# Patient Record
Sex: MALE | Race: WHITE | Employment: UNEMPLOYED | URBAN - METROPOLITAN AREA
[De-identification: names, ages, dates, MRNs, and addresses within clinical notes are randomized per-mention and may not be internally consistent; named-entity substitution may affect disease eponyms.]

---

## 2017-02-09 ENCOUNTER — HOSPITAL ENCOUNTER (OUTPATIENT)
Dept: SURGERY | Age: 57
Discharge: HOME | End: 2017-02-09
Attending: INTERNAL MEDICINE | Admitting: INTERNAL MEDICINE

## 2017-02-09 VITALS
WEIGHT: 225 LBS | OXYGEN SATURATION: 95 % | RESPIRATION RATE: 16 BRPM | HEART RATE: 79 BPM | DIASTOLIC BLOOD PRESSURE: 66 MMHG | HEIGHT: 71 IN | BODY MASS INDEX: 31.5 KG/M2 | TEMPERATURE: 98 F | SYSTOLIC BLOOD PRESSURE: 118 MMHG

## 2017-02-09 LAB
GLUCOSE BLD-MCNC: 102 MG/DL (ref 70–99)
PERFORMED ON: ABNORMAL

## 2017-02-09 RX ORDER — LIDOCAINE HYDROCHLORIDE 10 MG/ML
0.1 INJECTION, SOLUTION INFILTRATION; PERINEURAL ONCE
Status: DISCONTINUED | OUTPATIENT
Start: 2017-02-09 | End: 2017-02-10 | Stop reason: HOSPADM

## 2017-02-09 RX ORDER — SODIUM CHLORIDE, SODIUM LACTATE, POTASSIUM CHLORIDE, CALCIUM CHLORIDE 600; 310; 30; 20 MG/100ML; MG/100ML; MG/100ML; MG/100ML
INJECTION, SOLUTION INTRAVENOUS CONTINUOUS
Status: DISCONTINUED | OUTPATIENT
Start: 2017-02-09 | End: 2017-02-10 | Stop reason: HOSPADM

## 2017-02-09 RX ORDER — ATORVASTATIN CALCIUM 40 MG/1
40 TABLET, FILM COATED ORAL DAILY
COMMUNITY
End: 2017-08-14

## 2017-02-09 RX ORDER — LOSARTAN POTASSIUM 50 MG/1
50 TABLET ORAL DAILY
COMMUNITY
End: 2020-01-21 | Stop reason: SDUPTHER

## 2017-02-09 RX ADMIN — SODIUM CHLORIDE, SODIUM LACTATE, POTASSIUM CHLORIDE, CALCIUM CHLORIDE: 600; 310; 30; 20 INJECTION, SOLUTION INTRAVENOUS at 08:00

## 2017-02-09 ASSESSMENT — PAIN - FUNCTIONAL ASSESSMENT: PAIN_FUNCTIONAL_ASSESSMENT: 0-10

## 2017-05-25 ENCOUNTER — HOSPITAL ENCOUNTER (OUTPATIENT)
Dept: OTHER | Age: 57
Discharge: HOME | End: 2017-05-25

## 2017-05-25 DIAGNOSIS — M25.512 ACUTE SHOULDER PAIN DUE TO TRAUMA, LEFT: ICD-10-CM

## 2017-05-25 DIAGNOSIS — G89.11 ACUTE SHOULDER PAIN DUE TO TRAUMA, LEFT: ICD-10-CM

## 2017-07-10 ENCOUNTER — OFFICE VISIT (OUTPATIENT)
Dept: ORTHOPEDIC SURGERY | Age: 57
End: 2017-07-10

## 2017-07-10 VITALS — WEIGHT: 225.09 LBS | BODY MASS INDEX: 31.51 KG/M2 | HEIGHT: 71 IN

## 2017-07-10 DIAGNOSIS — M25.512 LEFT SHOULDER PAIN, UNSPECIFIED CHRONICITY: Primary | ICD-10-CM

## 2017-07-10 DIAGNOSIS — M75.122 COMPLETE TEAR OF LEFT ROTATOR CUFF: ICD-10-CM

## 2017-07-10 PROCEDURE — 73030 X-RAY EXAM OF SHOULDER: CPT | Performed by: ORTHOPAEDIC SURGERY

## 2017-07-10 PROCEDURE — 99203 OFFICE O/P NEW LOW 30 MIN: CPT | Performed by: ORTHOPAEDIC SURGERY

## 2017-08-07 ENCOUNTER — OFFICE VISIT (OUTPATIENT)
Dept: ORTHOPEDIC SURGERY | Age: 57
End: 2017-08-07

## 2017-08-07 VITALS — BODY MASS INDEX: 29.12 KG/M2 | WEIGHT: 215 LBS | HEIGHT: 72 IN

## 2017-08-07 DIAGNOSIS — M75.122 COMPLETE TEAR OF LEFT ROTATOR CUFF: Primary | ICD-10-CM

## 2017-08-07 PROCEDURE — 99213 OFFICE O/P EST LOW 20 MIN: CPT | Performed by: ORTHOPAEDIC SURGERY

## 2017-08-08 ENCOUNTER — TELEPHONE (OUTPATIENT)
Dept: ORTHOPEDIC SURGERY | Age: 57
End: 2017-08-08

## 2017-08-08 DIAGNOSIS — M75.122 COMPLETE ROTATOR CUFF TEAR OF LEFT SHOULDER: Primary | ICD-10-CM

## 2017-08-21 DIAGNOSIS — M75.122 COMPLETE ROTATOR CUFF TEAR OF LEFT SHOULDER: Primary | ICD-10-CM

## 2017-08-21 PROCEDURE — L3670 SO ACRO/CLAV CAN WEB PRE OTS: HCPCS | Performed by: ORTHOPAEDIC SURGERY

## 2017-08-22 ENCOUNTER — HOSPITAL ENCOUNTER (OUTPATIENT)
Dept: SURGERY | Age: 57
Discharge: HOME | End: 2017-08-22
Attending: ORTHOPAEDIC SURGERY | Admitting: ORTHOPAEDIC SURGERY

## 2017-08-22 VITALS
BODY MASS INDEX: 29.12 KG/M2 | SYSTOLIC BLOOD PRESSURE: 136 MMHG | HEIGHT: 72 IN | DIASTOLIC BLOOD PRESSURE: 75 MMHG | OXYGEN SATURATION: 100 % | WEIGHT: 215 LBS | HEART RATE: 90 BPM | TEMPERATURE: 97.2 F | RESPIRATION RATE: 18 BRPM

## 2017-08-22 LAB
GLUCOSE BLD-MCNC: 110 MG/DL (ref 70–99)
GLUCOSE BLD-MCNC: 113 MG/DL (ref 70–99)
PERFORMED ON: ABNORMAL
PERFORMED ON: ABNORMAL

## 2017-08-22 RX ORDER — ACETAMINOPHEN 10 MG/ML
1000 INJECTION, SOLUTION INTRAVENOUS ONCE
Status: COMPLETED | OUTPATIENT
Start: 2017-08-22 | End: 2017-08-22

## 2017-08-22 RX ORDER — MORPHINE SULFATE 2 MG/ML
1 INJECTION, SOLUTION INTRAMUSCULAR; INTRAVENOUS EVERY 5 MIN PRN
Status: DISCONTINUED | OUTPATIENT
Start: 2017-08-22 | End: 2017-08-23 | Stop reason: HOSPADM

## 2017-08-22 RX ORDER — LABETALOL HYDROCHLORIDE 5 MG/ML
5 INJECTION, SOLUTION INTRAVENOUS EVERY 10 MIN PRN
Status: DISCONTINUED | OUTPATIENT
Start: 2017-08-22 | End: 2017-08-23 | Stop reason: HOSPADM

## 2017-08-22 RX ORDER — OXYCODONE HYDROCHLORIDE AND ACETAMINOPHEN 5; 325 MG/1; MG/1
1 TABLET ORAL PRN
Status: ACTIVE | OUTPATIENT
Start: 2017-08-22 | End: 2017-08-22

## 2017-08-22 RX ORDER — OXYCODONE HYDROCHLORIDE AND ACETAMINOPHEN 5; 325 MG/1; MG/1
2 TABLET ORAL PRN
Status: ACTIVE | OUTPATIENT
Start: 2017-08-22 | End: 2017-08-22

## 2017-08-22 RX ORDER — SODIUM CHLORIDE 0.9 % (FLUSH) 0.9 %
10 SYRINGE (ML) INJECTION EVERY 12 HOURS SCHEDULED
Status: DISCONTINUED | OUTPATIENT
Start: 2017-08-22 | End: 2017-08-23 | Stop reason: HOSPADM

## 2017-08-22 RX ORDER — OXYCODONE HYDROCHLORIDE AND ACETAMINOPHEN 5; 325 MG/1; MG/1
1-2 TABLET ORAL EVERY 6 HOURS PRN
Qty: 40 TABLET | Refills: 0 | Status: SHIPPED | OUTPATIENT
Start: 2017-08-22 | End: 2019-07-03

## 2017-08-22 RX ORDER — DIPHENHYDRAMINE HYDROCHLORIDE 50 MG/ML
12.5 INJECTION INTRAMUSCULAR; INTRAVENOUS
Status: ACTIVE | OUTPATIENT
Start: 2017-08-22 | End: 2017-08-22

## 2017-08-22 RX ORDER — SODIUM CHLORIDE 0.9 % (FLUSH) 0.9 %
10 SYRINGE (ML) INJECTION PRN
Status: DISCONTINUED | OUTPATIENT
Start: 2017-08-22 | End: 2017-08-23 | Stop reason: HOSPADM

## 2017-08-22 RX ORDER — HYDRALAZINE HYDROCHLORIDE 20 MG/ML
5 INJECTION INTRAMUSCULAR; INTRAVENOUS
Status: DISCONTINUED | OUTPATIENT
Start: 2017-08-22 | End: 2017-08-23 | Stop reason: HOSPADM

## 2017-08-22 RX ORDER — LIDOCAINE HYDROCHLORIDE 10 MG/ML
1 INJECTION, SOLUTION EPIDURAL; INFILTRATION; INTRACAUDAL; PERINEURAL
Status: ACTIVE | OUTPATIENT
Start: 2017-08-22 | End: 2017-08-22

## 2017-08-22 RX ORDER — ONDANSETRON 2 MG/ML
4 INJECTION INTRAMUSCULAR; INTRAVENOUS
Status: ACTIVE | OUTPATIENT
Start: 2017-08-22 | End: 2017-08-22

## 2017-08-22 RX ORDER — SODIUM CHLORIDE, SODIUM LACTATE, POTASSIUM CHLORIDE, CALCIUM CHLORIDE 600; 310; 30; 20 MG/100ML; MG/100ML; MG/100ML; MG/100ML
INJECTION, SOLUTION INTRAVENOUS CONTINUOUS
Status: DISCONTINUED | OUTPATIENT
Start: 2017-08-22 | End: 2017-08-23 | Stop reason: HOSPADM

## 2017-08-22 RX ORDER — MIDAZOLAM HYDROCHLORIDE 1 MG/ML
INJECTION INTRAMUSCULAR; INTRAVENOUS
Status: DISPENSED
Start: 2017-08-22 | End: 2017-08-22

## 2017-08-22 RX ORDER — MORPHINE SULFATE 2 MG/ML
2 INJECTION, SOLUTION INTRAMUSCULAR; INTRAVENOUS EVERY 5 MIN PRN
Status: DISCONTINUED | OUTPATIENT
Start: 2017-08-22 | End: 2017-08-23 | Stop reason: HOSPADM

## 2017-08-22 RX ORDER — MEPERIDINE HYDROCHLORIDE 50 MG/ML
12.5 INJECTION INTRAMUSCULAR; INTRAVENOUS; SUBCUTANEOUS EVERY 5 MIN PRN
Status: DISCONTINUED | OUTPATIENT
Start: 2017-08-22 | End: 2017-08-23 | Stop reason: HOSPADM

## 2017-08-22 RX ADMIN — SODIUM CHLORIDE, SODIUM LACTATE, POTASSIUM CHLORIDE, CALCIUM CHLORIDE: 600; 310; 30; 20 INJECTION, SOLUTION INTRAVENOUS at 10:45

## 2017-08-22 RX ADMIN — ACETAMINOPHEN 1000 MG: 10 INJECTION, SOLUTION INTRAVENOUS at 10:52

## 2017-08-22 ASSESSMENT — PAIN DESCRIPTION - DESCRIPTORS: DESCRIPTORS: ACHING

## 2017-08-22 ASSESSMENT — PAIN - FUNCTIONAL ASSESSMENT: PAIN_FUNCTIONAL_ASSESSMENT: 0-10

## 2017-08-25 ENCOUNTER — HOSPITAL ENCOUNTER (OUTPATIENT)
Dept: PHYSICAL THERAPY | Age: 57
Discharge: HOME | End: 2017-08-31
Admitting: ORTHOPAEDIC SURGERY

## 2017-08-30 ENCOUNTER — OFFICE VISIT (OUTPATIENT)
Dept: ORTHOPEDIC SURGERY | Age: 57
End: 2017-08-30

## 2017-08-30 VITALS
HEART RATE: 85 BPM | SYSTOLIC BLOOD PRESSURE: 133 MMHG | WEIGHT: 215 LBS | HEIGHT: 72 IN | BODY MASS INDEX: 29.12 KG/M2 | DIASTOLIC BLOOD PRESSURE: 77 MMHG

## 2017-08-30 DIAGNOSIS — M25.512 PAIN OF LEFT SHOULDER REGION: Primary | ICD-10-CM

## 2017-08-30 DIAGNOSIS — M75.122 COMPLETE ROTATOR CUFF TEAR OF LEFT SHOULDER: ICD-10-CM

## 2017-08-30 PROCEDURE — 73030 X-RAY EXAM OF SHOULDER: CPT | Performed by: ORTHOPAEDIC SURGERY

## 2017-08-30 PROCEDURE — 99024 POSTOP FOLLOW-UP VISIT: CPT | Performed by: ORTHOPAEDIC SURGERY

## 2017-08-31 ENCOUNTER — TELEPHONE (OUTPATIENT)
Dept: ORTHOPEDIC SURGERY | Age: 57
End: 2017-08-31

## 2017-08-31 ENCOUNTER — HOSPITAL ENCOUNTER (OUTPATIENT)
Dept: PHYSICAL THERAPY | Age: 57
Discharge: HOME | End: 2017-08-31
Admitting: ORTHOPAEDIC SURGERY

## 2017-09-07 ENCOUNTER — HOSPITAL ENCOUNTER (OUTPATIENT)
Dept: PHYSICAL THERAPY | Age: 57
Discharge: HOME | End: 2017-09-07
Admitting: ORTHOPAEDIC SURGERY

## 2017-09-14 ENCOUNTER — HOSPITAL ENCOUNTER (OUTPATIENT)
Dept: PHYSICAL THERAPY | Age: 57
Discharge: HOME | End: 2017-09-14
Admitting: ORTHOPAEDIC SURGERY

## 2017-09-20 ENCOUNTER — OFFICE VISIT (OUTPATIENT)
Dept: ORTHOPEDIC SURGERY | Age: 57
End: 2017-09-20

## 2017-09-20 VITALS
HEIGHT: 72 IN | HEART RATE: 85 BPM | WEIGHT: 215 LBS | BODY MASS INDEX: 29.12 KG/M2 | SYSTOLIC BLOOD PRESSURE: 130 MMHG | DIASTOLIC BLOOD PRESSURE: 76 MMHG

## 2017-09-20 DIAGNOSIS — M75.122 COMPLETE ROTATOR CUFF TEAR OF LEFT SHOULDER: Primary | ICD-10-CM

## 2017-09-20 PROCEDURE — 99024 POSTOP FOLLOW-UP VISIT: CPT | Performed by: ORTHOPAEDIC SURGERY

## 2017-09-21 ENCOUNTER — HOSPITAL ENCOUNTER (OUTPATIENT)
Dept: PHYSICAL THERAPY | Age: 57
Discharge: HOME | End: 2017-09-21
Admitting: ORTHOPAEDIC SURGERY

## 2017-09-28 ENCOUNTER — HOSPITAL ENCOUNTER (OUTPATIENT)
Dept: PHYSICAL THERAPY | Age: 57
Discharge: HOME | End: 2017-09-28
Admitting: ORTHOPAEDIC SURGERY

## 2017-10-05 ENCOUNTER — HOSPITAL ENCOUNTER (OUTPATIENT)
Dept: PHYSICAL THERAPY | Age: 57
Discharge: HOME | End: 2017-10-05
Admitting: ORTHOPAEDIC SURGERY

## 2017-10-05 NOTE — FLOWSHEET NOTE
[address], [facility]  [facility] [place], [address]                Physical Therapy Daily Treatment Note  Date:  10/5/2017    Patient Name:  Sandy Jones    :  1960  MRN: [id number]  Restrictions/Precautions:    Medical/Treatment Diagnosis Information:  · Diagnosis: M75.122 (ICD-10-CM) - Complete rotator cuff tear of left shoulder  · Treatment Diagnosis: L shoulder pain, s/p L shoulder large RCR with SAD DOS 45  Insurance/Certification information:  PT Insurance Information: [employer]  $10 CP  70/30 20 PT  Physician Information:  Referring Practitioner: Dr. Haseeb Xie of care signed (Y/N):     Date of Patient follow up with Physician:     G-Code (if applicable):      Date G-Code Applied:         Progress Note: [x]  Yes  []  No  Next due by: Visit #10      Latex Allergy:  [x]NO      []YES  Preferred Language for Healthcare:   [x]English       []other:    Visit # Insurance Allowable   7 20      Pain level:  2/10     The pt is 8 weeks post-op on 10/17/17. SUBJECTIVE:  Pt reports some mild soreness but no pain at this time. OBJECTIVE:   Observation:   Test measurements:  Flexion approx 110 deg   ER 45 deg at 45 deg of abd     The pt is 8 weeks post-op on 10/17/2017. RESTRICTIONS/PRECAUTIONS: PROM only 8 weeks,  4 weeks regular sling.      Exercises/Interventions:   Therapeutic Ex Sets/sec Reps Notes HEP   Fav 4 2 10     AROM elbow/wrist/hand 2 20     Table slides flex/er 5'' 10                                                                                                                     Manual Intervention       PROM flex/ER    10'                                              NMR re-education                                                                   Therapeutic Exercise and NMR EXR  [x] (83191) Provided verbal/tactile cueing for activities related to strengthening, flexibility, endurance, ROM  for improvements in scapular, scapulothoracic and UE control with self care, reaching, carrying, lifting, house/yardwork, driving/computer work.    [] (03413) Provided verbal/tactile cueing for activities related to improving balance, coordination, kinesthetic sense, posture, motor skill, proprioception  to assist with  scapular, scapulothoracic and UE control with self care, reaching, carrying, lifting, house/yardwork, driving/computer work. Therapeutic Activities:    [] (22394 or 07865) Provided verbal/tactile cueing for activities related to improving balance, coordination, kinesthetic sense, posture, motor skill, proprioception and motor activation to allow for proper function of scapular, scapulothoracic and UE control with self care, carrying, lifting, driving/computer work. Home Exercise Program:    [x] (47623) Reviewed/Progressed HEP activities related to strengthening, flexibility, endurance, ROM of scapular, scapulothoracic and UE control with self care, reaching, carrying, lifting, house/yardwork, driving/computer work  [] (77764) Reviewed/Progressed HEP activities related to improving balance, coordination, kinesthetic sense, posture, motor skill, proprioception of scapular, scapulothoracic and UE control with self care, reaching, carrying, lifting, house/yardwork, driving/computer work      Manual Treatments:  PROM / STM / Oscillations-Mobs:  G-I, II, III, IV (PA's, Inf., Post.)  [] (74976) Provided manual therapy to mobilize soft tissue/joints of cervical/CT, scapular GHJ and UE for the purpose of modulating pain, promoting relaxation,  increasing ROM, reducing/eliminating soft tissue swelling/inflammation/restriction, improving soft tissue extensibility and allowing for proper ROM for normal function with self care, reaching, carrying, lifting, house/yardwork, driving/computer work    Modalities:  Electrical stimulation for pain control. Waveform: Premod;  Cycle Time: Continuous;

## 2017-10-12 ENCOUNTER — HOSPITAL ENCOUNTER (OUTPATIENT)
Dept: PHYSICAL THERAPY | Age: 57
Discharge: HOME | End: 2017-10-12
Admitting: ORTHOPAEDIC SURGERY

## 2017-10-12 NOTE — FLOWSHEET NOTE
[address] [place] [address],[address] [place], [address]                Physical Therapy Daily Treatment Note  Date:  10/12/2017    Patient Name:  Debbi Flaherty    :  1960  MRN: [id number]  Restrictions/Precautions:    Medical/Treatment Diagnosis Information:  · Diagnosis: M75.122 (ICD-10-CM) - Complete rotator cuff tear of left shoulder  · Treatment Diagnosis: L shoulder pain, s/p L shoulder large RCR with SAD DOS   Insurance/Certification information:  PT Insurance Information: [employer]  $10 CP  70/30 20 PT  Physician Information:  Referring Practitioner: Dr. Viridiana Bowers of care signed (Y/N):     Date of Patient follow up with Physician:     G-Code (if applicable):      Date G-Code Applied:         Progress Note: [x]  Yes  []  No  Next due by: Visit #10      Latex Allergy:  [x]NO      []YES  Preferred Language for Healthcare:   [x]English       []other:    Visit # Insurance Allowable   8 20      Pain level:  2/10     The pt is 8 weeks post-op on 10/17/17. SUBJECTIVE:  Pt reports he is feeling better and better each week. OBJECTIVE:   Observation:   Test measurements:  Flexion approx 110 deg   ER 45 deg at 45 deg of abd     The pt is 8 weeks post-op on 10/17/2017. RESTRICTIONS/PRECAUTIONS: PROM only 8 weeks,  4 weeks regular sling.      Exercises/Interventions:   Therapeutic Ex Sets/sec Reps Notes HEP   Fav 4 2 10     AROM elbow/wrist/hand 2 20     Table slides flex/er 5'' 10                                                                                                                     Manual Intervention       PROM flex/ER    10'                                              NMR re-education                                                                   Therapeutic Exercise and NMR EXR  [x] (27517) Provided verbal/tactile cueing for activities related to strengthening, flexibility, Frequency: 80/150 Hz; Amplitude: 15 Volts; Treatment time: 10 min with cold pack. Charges:  Timed Code Treatment Minutes: 30   Total Treatment Minutes: 40     [] EVAL (LOW) 96938 (typically 20 minutes face-to-face)    [] EVAL  [x] YX(10154) x  1   [] IONTO  [] NMR (68933) x      [] VASO  [x] Manual (23015) x  1    [] Other:  [] TA x       [] Mech Traction (44468)  [] ES(attended) (13197)      [x] ES (un) (29674):     Manju Leon stated goal: Pt would like to return to pain free work activities.      Therapist goals for Patient:   Short Term Goals: To be achieved in: 2 weeks  1. Independent in HEP and progression per patient tolerance, in order to prevent re-injury. 2. Patient will have a decrease in pain to facilitate improvement in movement, function, and ADLs as indicated by Functional Deficits.     Long Term Goals: To be achieved in: 12 weeks  1. Disability index score of 20% or less for the DASH to assist with reaching prior level of function. 2. Patient will demonstrate increased AROM to L shoulder = to R shoulder to allow for proper joint functioning as indicated by patients Functional Deficits. 3. Patient will demonstrate an increase in Strength to 5/5 in all planes of L shoulder to allow for proper functional mobility as indicated by patients Functional Deficits. 4. Patient will return to all functional activities without increased symptoms or restriction. 5. Pt will demonstrate the ability to lift 5 lbs to shoulder height without UT compensations. (patient specific functional goal)     Progression Towards Functional goals:  [] Patient is progressing as expected towards functional goals listed. [] Progression is slowed due to complexities listed. [] Progression has been slowed due to co-morbidities.   [x] Plan just implemented, too soon to assess goals progression  [] Other:     ASSESSMENT:  See eval    Treatment/Activity Tolerance:  [x] Patient tolerated treatment well [] Patient

## 2017-10-18 ENCOUNTER — OFFICE VISIT (OUTPATIENT)
Dept: ORTHOPEDIC SURGERY | Age: 57
End: 2017-10-18

## 2017-10-18 VITALS
DIASTOLIC BLOOD PRESSURE: 74 MMHG | HEART RATE: 63 BPM | WEIGHT: 215 LBS | BODY MASS INDEX: 29.12 KG/M2 | HEIGHT: 72 IN | SYSTOLIC BLOOD PRESSURE: 131 MMHG

## 2017-10-18 DIAGNOSIS — M75.122 COMPLETE ROTATOR CUFF TEAR OF LEFT SHOULDER: Primary | ICD-10-CM

## 2017-10-18 PROCEDURE — 99024 POSTOP FOLLOW-UP VISIT: CPT | Performed by: ORTHOPAEDIC SURGERY

## 2017-10-18 NOTE — PROGRESS NOTES
outpatient physical therapy. At this point, he can begin gentle active range of motion and gentle strengthening. He can also discontinue the use of his sling today. Patient is anxious to get back to work with he works as a  and  on doing a lot of overhead and heavy lifting. We'll get her ready to go back to work. He'll follow-up with us in approximately 3 weeks and then at that point we may release him to do some light lifting. Samir Martin PA-C, scribing for Dr. Mervat Morrissey          This dictation was performed with a verbal recognition program [facility] ) and it was checked for errors. It is possible that there are still dictated errors within this office note. If so, please bring any errors to my attention for an addendum. All efforts were made to ensure that this office note is accurate.

## 2017-10-19 ENCOUNTER — HOSPITAL ENCOUNTER (OUTPATIENT)
Dept: PHYSICAL THERAPY | Age: 57
Discharge: HOME | End: 2017-10-19
Admitting: ORTHOPAEDIC SURGERY

## 2017-10-19 NOTE — FLOWSHEET NOTE
[address] [place], [address]                Physical Therapy Daily Treatment Note  Date:  10/19/2017    Patient Name:  Promise Sousa    :  1960  MRN: [id number]  Restrictions/Precautions:    Medical/Treatment Diagnosis Information:  · Diagnosis: M75.122 (ICD-10-CM) - Complete rotator cuff tear of left shoulder  · Treatment Diagnosis: L shoulder pain, s/p L shoulder large RCR with SAD DOS 77  Insurance/Certification information:  PT Insurance Information: [employer]  $10 CP  70/30 20 PT  Physician Information:  Referring Practitioner: Dr. Richi Harding of care signed (Y/N):     Date of Patient follow up with Physician:     G-Code (if applicable):      Date G-Code Applied:         Progress Note: [x]  Yes  []  No  Next due by: Visit #10      Latex Allergy:  [x]NO      []YES  Preferred Language for Healthcare:   [x]English       []other:    Visit # Insurance Allowable   9 20      Pain level:  3/10     The pt is 8 weeks post-op on 10/17/17. SUBJECTIVE:  Pt reports some mild soreness at this time but no real pain. OBJECTIVE:   Observation:   Test measurements:  Flexion approx 110 deg   ER 45 deg at 45 deg of abd     The pt is 8 weeks post-op on 10/17/2017. RESTRICTIONS/PRECAUTIONS:   Pt is able to begin AAROM-AROM at this time.      Exercises/Interventions:   Therapeutic Ex Sets/sec Reps Notes HEP   Fav 4 2 10     AROM elbow/wrist/hand 2 20     Table slides flex/er 5'' 10     pulleys 5'      iso 5 way 5'' 10     Cane press plus 2 10     Cane flexion 2 10                                                                                         Manual Intervention       PROM flex/ER    10'                                              NMR re-education                                                                   Therapeutic Exercise and NMR EXR  [x] (69818) Provided verbal/tactile stimulation for pain control. Waveform: Premod; Cycle Time: Continuous; Frequency: 80/150 Hz; Amplitude: 15 Volts; Treatment time: 10 min with cold pack. Charges:  Timed Code Treatment Minutes: 30   Total Treatment Minutes: 40     [] EVAL (LOW) 14863 (typically 20 minutes face-to-face)    [] EVAL  [x] ZB(54320) x  1   [] IONTO  [x] NMR (52412) x  1   [] VASO  [x] Manual (21536) x       [] Other:  [] TA x       [] Mech Traction (12737)  [] ES(attended) (17850)      [x] ES (un) (98363):     Ernie Ambriz stated goal: Pt would like to return to pain free work activities.      Therapist goals for Patient:   Short Term Goals: To be achieved in: 2 weeks  1. Independent in HEP and progression per patient tolerance, in order to prevent re-injury. 2. Patient will have a decrease in pain to facilitate improvement in movement, function, and ADLs as indicated by Functional Deficits.     Long Term Goals: To be achieved in: 12 weeks  1. Disability index score of 20% or less for the DASH to assist with reaching prior level of function. 2. Patient will demonstrate increased AROM to L shoulder = to R shoulder to allow for proper joint functioning as indicated by patients Functional Deficits. 3. Patient will demonstrate an increase in Strength to 5/5 in all planes of L shoulder to allow for proper functional mobility as indicated by patients Functional Deficits. 4. Patient will return to all functional activities without increased symptoms or restriction. 5. Pt will demonstrate the ability to lift 5 lbs to shoulder height without UT compensations. (patient specific functional goal)     Progression Towards Functional goals:  [] Patient is progressing as expected towards functional goals listed. [] Progression is slowed due to complexities listed. [] Progression has been slowed due to co-morbidities.   [x] Plan just implemented, too soon to assess goals progression  [] Other:     ASSESSMENT:  See

## 2017-10-24 ENCOUNTER — HOSPITAL ENCOUNTER (OUTPATIENT)
Dept: PHYSICAL THERAPY | Age: 57
Discharge: HOME | End: 2017-10-24
Admitting: ORTHOPAEDIC SURGERY

## 2017-10-24 NOTE — PLAN OF CARE
[address] [place], [address]   Physical Therapy Re-Certification Plan of Care    Dear  Dr. Tana Ling,    We had the pleasure of treating the following patient for physical therapy services at [address] [place] [address]. A summary of our findings can be found in the updated assessment below. This includes our plan of care. If you have any questions or concerns regarding these findings, please do not hesitate to contact me at the office phone number checked above.   Thank you for the referral.     Physician Signature:________________________________Date:__________________  By signing above (or electronic signature), therapists plan is approved by physician      Overall Response to Treatment:   [x]Patient is responding well to treatment and improvement is noted with regards to goals   []Patient should continue to improve in reasonable time if they continue HEP   []Patient has plateaued and is no longer responding to skilled PT intervention    []Patient is getting worse and would benefit from return to referring MD   []Patient unable to adhere to initial POC   []Other:              Physical Therapy Daily Treatment Note  Date:  10/24/2017    Patient Name:  Jose Huynh    :  1960  MRN: [id number]  Restrictions/Precautions:    Medical/Treatment Diagnosis Information:  · Diagnosis: M75.122 (ICD-10-CM) - Complete rotator cuff tear of left shoulder  · Treatment Diagnosis: L shoulder pain, s/p L shoulder large RCR with SAD DOS 3/57/78  Insurance/Certification information:  PT Insurance Information: [employer]  $10 CP  70/30 20 PT  Physician Information:  Referring Practitioner: Dr. [place] of care signed (Y/N):     Date of Patient follow up with Physician:     G-Code (if applicable):      Date G-Code Applied:    PT G-Codes  Functional Assessment Tool Used: quick dash  Score: 50%  Functional Limitation: Carrying, moving and handling objects  Carrying, Moving and Handling Objects Current Status (): At least 40 percent but less than 60 percent impaired, limited or restricted  Carrying, Moving and Handling Objects Goal Status (): At least 20 percent but less than 40 percent impaired, limited or restricted    Progress Note: [x]  Yes  []  No  Next due by: Visit #10      Latex Allergy:  [x]NO      []YES  Preferred Language for Healthcare:   [x]English       []other:    Visit # Insurance Allowable   10 20      Pain level:  0/10     The pt is 8 weeks post-op on 10/17/17. SUBJECTIVE:  Pt reports some mild soreness but no pain at this time. OBJECTIVE: Pt demonstrates UT compensations with AROM elevation and is unable to raise 5lbs to shoulder height. Observation:   Test measurements:  Flexion approx 140 deg   ER 45 deg at 45 deg of abd, Abd 130 deg           MMT at side in all planes 4/5         The pt is 8 weeks post-op on 10/17/2017. RESTRICTIONS/PRECAUTIONS:   Pt is able to begin AAROM-AROM at this time.      Exercises/Interventions:   Therapeutic Ex Sets/sec Reps Notes HEP   Fav 4 2 10     AROM elbow/wrist/hand 2 20     Table slides flex/er 5'' 10     pulleys 5'      iso 5 way 5'' 10     Cane press plus 2 10     Cane flexion/ER 2 10     SL ER/ABD 3 10     Bicep curl NV      TB row/ext 2 15     Towel S IR 5'' 10                                                             Manual Intervention       PROM flex/ER    10'                                              NMR re-education                                                                   Therapeutic Exercise and NMR EXR  [x] (31072) Provided verbal/tactile cueing for activities related to strengthening, flexibility, endurance, ROM  for improvements in scapular, scapulothoracic and UE control with self care, reaching, carrying, lifting, house/yardwork, driving/computer work.    [] (27949) Provided verbal/tactile cueing for face-to-face)    [] EVAL  [x] OY(95740) x  1   [] IONTO  [x] NMR (46623) x  1   [] VASO  [x] Manual (23579) x  1    [] Other:  [] TA x       [] Mech Traction (64420)  [] ES(attended) (27495)      [x] ES (un) (24107):     Roslyn Joseph stated goal: Pt would like to return to pain free work activities.      Therapist goals for Patient:   Short Term Goals: To be achieved in: 2 weeks  1. Independent in HEP and progression per patient tolerance, in order to prevent re-injury. 2. Patient will have a decrease in pain to facilitate improvement in movement, function, and ADLs as indicated by Functional Deficits.     Long Term Goals: To be achieved in: 12 weeks  1. Disability index score of 20% or less for the DASH to assist with reaching prior level of function. 2. Patient will demonstrate increased AROM to L shoulder = to R shoulder to allow for proper joint functioning as indicated by patients Functional Deficits. 3. Patient will demonstrate an increase in Strength to 5/5 in all planes of L shoulder to allow for proper functional mobility as indicated by patients Functional Deficits. 4. Patient will return to all functional activities without increased symptoms or restriction. 5. Pt will demonstrate the ability to lift 5 lbs to shoulder height without UT compensations. (patient specific functional goal)     Progression Towards Functional goals:  [] Patient is progressing as expected towards functional goals listed. [] Progression is slowed due to complexities listed. [] Progression has been slowed due to co-morbidities.   [x] Plan just implemented, too soon to assess goals progression  [] Other:     ASSESSMENT:  See eval    Treatment/Activity Tolerance:  [x] Patient tolerated treatment well [] Patient limited by fatique  [] Patient limited by pain  [] Patient limited by other medical complications  [] Other:     Prognosis: [x] Good [] Fair  [] Poor    Patient Requires Follow-up: [x] Yes  [] No    PLAN: See eval  [x] Continue per plan of care [] Alter current plan (see comments)  [] Plan of care initiated [] Hold pending MD visit [] Discharge    Electronically signed by: Gonzales Yates PT,DPT

## 2017-10-31 ENCOUNTER — HOSPITAL ENCOUNTER (OUTPATIENT)
Dept: PHYSICAL THERAPY | Age: 57
Discharge: HOME | End: 2017-10-31
Admitting: ORTHOPAEDIC SURGERY

## 2017-10-31 NOTE — FLOWSHEET NOTE
[address] [place], [address]        Physical Therapy Daily Treatment Note  Date:  10/31/2017    Patient Name:  Ramos Pineda    :  1960  MRN: [id number]  Restrictions/Precautions:    Medical/Treatment Diagnosis Information:  · Diagnosis: M75.122 (ICD-10-CM) - Complete rotator cuff tear of left shoulder  · Treatment Diagnosis: L shoulder pain, s/p L shoulder large RCR with SAD DOS 53  Insurance/Certification information:  PT Insurance Information: [employer]  $10 CP  70/30 20 PT  Physician Information:  Referring Practitioner: Dr. Demetrice Chauhan of care signed (Y/N):     Date of Patient follow up with Physician:     G-Code (if applicable):      Date G-Code Applied:         Progress Note: [x]  Yes  []  No  Next due by: Visit #10      Latex Allergy:  [x]NO      []YES  Preferred Language for Healthcare:   [x]English       []other:    Visit # Insurance Allowable   11 20      Pain level:  0/10     The pt is 8 weeks post-op on 10/17/17. SUBJECTIVE:  Pt reports no issues at this time. OBJECTIVE: Pt demonstrates UT compensations with AROM elevation and is unable to raise 5lbs to shoulder height. Observation:   Test measurements:  Flexion approx 140 deg   ER 45 deg at 45 deg of abd, Abd 130 deg           MMT at side in all planes 4/5         The pt is 8 weeks post-op on 10/17/2017. RESTRICTIONS/PRECAUTIONS:   Pt is able to begin AAROM-AROM at this time.      Exercises/Interventions:   Therapeutic Ex Sets/sec Reps Notes HEP   Fav 4 2 10     AROM elbow/wrist/hand 2 20     Table slides flex/er 5'' 10     pulleys 5'      iso 5 way 5'' 10     Cane press plus 2 10     Cane flexion/ER 2 10     SL ER/ABD 3 10 2#->1#    Bicep curl NV      TB row/ext 2 15     Towel S IR 5'' 10     Supine flexion 3 10 3#    No money 3 10 RTB    Self ER S 5'' 10                                        Manual swelling/inflammation/restriction, improving soft tissue extensibility and allowing for proper ROM for normal function with self care, reaching, carrying, lifting, house/yardwork, driving/computer work    Modalities:  Electrical stimulation for pain control. Waveform: Premod; Cycle Time: Continuous; Frequency: 80/150 Hz; Amplitude: 15 Volts; Treatment time: 10 min with cold pack. Charges:  Timed Code Treatment Minutes: 40   Total Treatment Minutes: 50     [] EVAL (LOW) 81615 (typically 20 minutes face-to-face)    [] EVAL  [x] LD(20174) x  1   [] IONTO  [x] NMR (55117) x  1   [] VASO  [x] Manual (24223) x  1    [] Other:  [] TA x       [] Mech Traction (47566)  [] ES(attended) (05901)      [x] ES (un) (62214):     Ernie Ambriz stated goal: Pt would like to return to pain free work activities.      Therapist goals for Patient:   Short Term Goals: To be achieved in: 2 weeks  1. Independent in HEP and progression per patient tolerance, in order to prevent re-injury. 2. Patient will have a decrease in pain to facilitate improvement in movement, function, and ADLs as indicated by Functional Deficits.     Long Term Goals: To be achieved in: 12 weeks  1. Disability index score of 20% or less for the DASH to assist with reaching prior level of function. 2. Patient will demonstrate increased AROM to L shoulder = to R shoulder to allow for proper joint functioning as indicated by patients Functional Deficits. 3. Patient will demonstrate an increase in Strength to 5/5 in all planes of L shoulder to allow for proper functional mobility as indicated by patients Functional Deficits. 4. Patient will return to all functional activities without increased symptoms or restriction.    5. Pt will demonstrate the ability to lift 5 lbs to shoulder height without UT compensations. (patient specific functional goal)     Progression Towards Functional goals:  [] Patient is progressing as expected towards functional goals listed. [] Progression is slowed due to complexities listed. [] Progression has been slowed due to co-morbidities.   [x] Plan just implemented, too soon to assess goals progression  [] Other:     ASSESSMENT:  See eval    Treatment/Activity Tolerance:  [x] Patient tolerated treatment well [] Patient limited by fatique  [] Patient limited by pain  [] Patient limited by other medical complications  [] Other:     Prognosis: [x] Good [] Fair  [] Poor    Patient Requires Follow-up: [x] Yes  [] No    PLAN: See eval  [x] Continue per plan of care [] Alter current plan (see comments)  [] Plan of care initiated [] Hold pending MD visit [] Discharge    Electronically signed by: Joaquin Moya PT,DPT

## 2017-11-01 ENCOUNTER — HOSPITAL ENCOUNTER (OUTPATIENT)
Dept: PHYSICAL THERAPY | Age: 57
Discharge: HOME | End: 2017-11-30
Attending: ORTHOPAEDIC SURGERY | Admitting: ORTHOPAEDIC SURGERY

## 2017-11-02 ENCOUNTER — HOSPITAL ENCOUNTER (OUTPATIENT)
Dept: PHYSICAL THERAPY | Age: 57
Discharge: HOME | End: 2017-11-02
Admitting: ORTHOPAEDIC SURGERY

## 2017-11-02 NOTE — FLOWSHEET NOTE
extensibility and allowing for proper ROM for normal function with self care, reaching, carrying, lifting, house/yardwork, driving/computer work    Modalities:  Electrical stimulation for pain control. Waveform: Premod; Cycle Time: Continuous; Frequency: 80/150 Hz; Amplitude: 15 Volts; Treatment time: 10 min with cold pack. Charges:  Timed Code Treatment Minutes: 50   Total Treatment Minutes: 50     [] EVAL (LOW) 20165 (typically 20 minutes face-to-face)    [] EVAL  [x] PO(90051) x  2   [] IONTO  [x] NMR (09716) x  1   [] VASO   [x] Manual (77941) x  1    [] Other:  [] TA x       [] Mech Traction (85121)  [] ES(attended) (92219)      [] ES (un) (03767):     Sha Lal stated goal: Pt would like to return to pain free work activities.      Therapist goals for Patient:   Short Term Goals: To be achieved in: 2 weeks  1. Independent in HEP and progression per patient tolerance, in order to prevent re-injury. 2. Patient will have a decrease in pain to facilitate improvement in movement, function, and ADLs as indicated by Functional Deficits.     Long Term Goals: To be achieved in: 12 weeks  1. Disability index score of 20% or less for the DASH to assist with reaching prior level of function. 2. Patient will demonstrate increased AROM to L shoulder = to R shoulder to allow for proper joint functioning as indicated by patients Functional Deficits. 3. Patient will demonstrate an increase in Strength to 5/5 in all planes of L shoulder to allow for proper functional mobility as indicated by patients Functional Deficits. 4. Patient will return to all functional activities without increased symptoms or restriction. 5. Pt will demonstrate the ability to lift 5 lbs to shoulder height without UT compensations. (patient specific functional goal)     Progression Towards Functional goals:  [] Patient is progressing as expected towards functional goals listed.     [] Progression is slowed due to complexities listed. [] Progression has been slowed due to co-morbidities.   [x] Plan just implemented, too soon to assess goals progression  [] Other:     ASSESSMENT:  See eval    Treatment/Activity Tolerance:  [x] Patient tolerated treatment well [] Patient limited by fatique  [] Patient limited by pain  [] Patient limited by other medical complications  [] Other:     Prognosis: [x] Good [] Fair  [] Poor    Patient Requires Follow-up: [x] Yes  [] No    PLAN: See eval  [x] Continue per plan of care [] Alter current plan (see comments)  [] Plan of care initiated [] Hold pending MD visit [] Discharge    Electronically signed by: Rachel Wheat PT,DPT

## 2017-11-07 ENCOUNTER — HOSPITAL ENCOUNTER (OUTPATIENT)
Dept: PHYSICAL THERAPY | Age: 57
Discharge: HOME | End: 2017-11-07
Admitting: ORTHOPAEDIC SURGERY

## 2017-11-07 NOTE — FLOWSHEET NOTE
get to 80 deg before compensation                                Manual Intervention       PROM flex/ER    10'                                              NMR re-education                                                                   Therapeutic Exercise and NMR EXR  [x] (01629) Provided verbal/tactile cueing for activities related to strengthening, flexibility, endurance, ROM  for improvements in scapular, scapulothoracic and UE control with self care, reaching, carrying, lifting, house/yardwork, driving/computer work.    [] (30930) Provided verbal/tactile cueing for activities related to improving balance, coordination, kinesthetic sense, posture, motor skill, proprioception  to assist with  scapular, scapulothoracic and UE control with self care, reaching, carrying, lifting, house/yardwork, driving/computer work. Therapeutic Activities:    [] (12929 or 28794) Provided verbal/tactile cueing for activities related to improving balance, coordination, kinesthetic sense, posture, motor skill, proprioception and motor activation to allow for proper function of scapular, scapulothoracic and UE control with self care, carrying, lifting, driving/computer work.      Home Exercise Program:    [x] (77146) Reviewed/Progressed HEP activities related to strengthening, flexibility, endurance, ROM of scapular, scapulothoracic and UE control with self care, reaching, carrying, lifting, house/yardwork, driving/computer work  [] (74497) Reviewed/Progressed HEP activities related to improving balance, coordination, kinesthetic sense, posture, motor skill, proprioception of scapular, scapulothoracic and UE control with self care, reaching, carrying, lifting, house/yardwork, driving/computer work      Manual Treatments:  PROM / STM / Oscillations-Mobs:  G-I, II, III, IV (PA's, Inf., Post.)  [] (43959) Provided manual therapy to mobilize soft tissue/joints of cervical/CT, scapular GHJ and UE for the purpose of modulating pain, promoting relaxation,  increasing ROM, reducing/eliminating soft tissue swelling/inflammation/restriction, improving soft tissue extensibility and allowing for proper ROM for normal function with self care, reaching, carrying, lifting, house/yardwork, driving/computer work    Modalities:  declined. Charges:  Timed Code Treatment Minutes: 40   Total Treatment Minutes: 40     [] EVAL (LOW) 16358 (typically 20 minutes face-to-face)    [] EVAL  [x] MJ(52987) x  1   [] IONTO  [x] NMR (07468) x  1   [] VASO   [x] Manual (77073) x  1    [] Other:  [] TA x       [] Mech Traction (36711)  [] ES(attended) (27713)      [] ES (un) (33635):     Mark Anthony Lake stated goal: Pt would like to return to pain free work activities.      Therapist goals for Patient:   Short Term Goals: To be achieved in: 2 weeks  1. Independent in HEP and progression per patient tolerance, in order to prevent re-injury. 2. Patient will have a decrease in pain to facilitate improvement in movement, function, and ADLs as indicated by Functional Deficits.     Long Term Goals: To be achieved in: 12 weeks  1. Disability index score of 20% or less for the DASH to assist with reaching prior level of function. 2. Patient will demonstrate increased AROM to L shoulder = to R shoulder to allow for proper joint functioning as indicated by patients Functional Deficits. 3. Patient will demonstrate an increase in Strength to 5/5 in all planes of L shoulder to allow for proper functional mobility as indicated by patients Functional Deficits. 4. Patient will return to all functional activities without increased symptoms or restriction. 5. Pt will demonstrate the ability to lift 5 lbs to shoulder height without UT compensations. (patient specific functional goal)     Progression Towards Functional goals:  [] Patient is progressing as expected towards functional goals listed. [] Progression is slowed due to complexities listed.   [] Progression has been slowed due to co-morbidities. [x] Plan just implemented, too soon to assess goals progression  [] Other:     ASSESSMENT:  Patient has improved range of motion but continues to demonstrate upper trap compensations with flexion and abduction. He is able to correct with verbal and visual cues up to 90 deg.     Treatment/Activity Tolerance:  [x] Patient tolerated treatment well [] Patient limited by fatique  [] Patient limited by pain  [] Patient limited by other medical complications  [] Other:     Prognosis: [x] Good [] Fair  [] Poor    Patient Requires Follow-up: [x] Yes  [] No    PLAN: See eval  [x] Continue per plan of care [] Alter current plan (see comments)  [] Plan of care initiated [] Hold pending MD visit [] Discharge    Electronically signed by: Raymundo Joseph PT,DPT

## 2017-11-08 ENCOUNTER — OFFICE VISIT (OUTPATIENT)
Dept: ORTHOPEDIC SURGERY | Age: 57
End: 2017-11-08

## 2017-11-08 VITALS — WEIGHT: 214.95 LBS | HEIGHT: 72 IN | BODY MASS INDEX: 29.11 KG/M2

## 2017-11-08 DIAGNOSIS — M75.122 COMPLETE ROTATOR CUFF TEAR OF LEFT SHOULDER: Primary | ICD-10-CM

## 2017-11-08 PROCEDURE — 99024 POSTOP FOLLOW-UP VISIT: CPT | Performed by: PHYSICIAN ASSISTANT

## 2017-11-08 NOTE — PROGRESS NOTES
Chief Complaint  Post-Op Check (left shoulder)    Post op left shoulder arthroscopy with large rotator cuff repair and subacromial decompression  Date of surgery: 08/22/2017       History of Present Illness:  Lee Frias is a 59 y.o. male  Here for follow up of left shoulder arthroscopy with large rotator cuff repair and subacromial decompression. Progressing well. Continues with outpatient physical therapy (1x/wk). The patient's pain is rated at 1-3/10. The patient denies fever, wound drainage, increasing redness, pus, increasing swelling. Post op problems reported: none. Patient is taking Ibuprofen on a p.r.n. basis and continues to use his ice machine which helps a great deal.  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 Vital Signs  There were no vitals filed for this visit. Pain Assessment            Shoulder Examination  Patient is awake, alert, and in no acute distress. Portals have healed well. Skin is intact. No swelling, ecchymosis or erythema. Distal circulatory status and neurologic status is intact. The wound is clean, dry, healed. There is no warmth, erythema, or purulent drainage over the incision. Sensation is intact to light touch in the axillary, median, radial, and ulnar nerve distribution. The EPL, FPL, and interossei are grossly intact. The left upper extremity is warm and well-perfused with brisk capillary refill. ROM has been progressing, but is still stiff. Forward flexion to approximately 90°, abduction to approximately 75°  Adequate strength noted. Rotator cuff appears to be intact, just weak. None  Diagnostic Test Findings: No new xrays were taken today        Assessment: Progressing well post op from left shoulder arthroscopy with large rotator cuff repair and subacromial decompression. Impression:  Encounter Diagnosis   Name Primary?     Complete rotator cuff tear of left

## 2017-11-09 ENCOUNTER — HOSPITAL ENCOUNTER (OUTPATIENT)
Dept: PHYSICAL THERAPY | Age: 57
Discharge: HOME | End: 2017-11-09
Admitting: ORTHOPAEDIC SURGERY

## 2017-11-09 NOTE — FLOWSHEET NOTE
[address] [place], [address]        Physical Therapy Daily Treatment Note  Date:  2017    Patient Name:  Promise Sousa    :  1960  MRN: [id number]  Restrictions/Precautions:    Medical/Treatment Diagnosis Information:  · Diagnosis: M75.122 (ICD-10-CM) - Complete rotator cuff tear of left shoulder  · Treatment Diagnosis: L shoulder pain, s/p L shoulder large RCR with SAD DOS   Insurance/Certification information:  PT Insurance Information: [employer]  $10 CP  70/30 20 PT  Physician Information:  Referring Practitioner: Dr. Richi Harding of care signed (Y/N):     Date of Patient follow up with Physician:     G-Code (if applicable):      Date G-Code Applied:         Progress Note: [x]  Yes  []  No  Next due by: Visit #10      Latex Allergy:  [x]NO      []YES  Preferred Language for Healthcare:   [x]English       []other:    Visit # Insurance Allowable   14 20      Pain level:  0/10     The pt is 8 weeks post-op on 10/17/17. The pt is 12 weeks post-op on 17. SUBJECTIVE:  Pt reports that his shoulder feels \"not bad\" today. Per Md, allowed to start light strengthening and return to light duty at work. OBJECTIVE: Pt demonstrates UT compensations with AROM elevation and is unable to raise 5lbs to shoulder height. Observation:   Test measurements:  Flexion approx 148 deg   ER 55 deg at 45 deg of abd, Abd 130 deg           MMT at side in all planes 4/5         The pt is 8 weeks post-op on 10/17/2017. RESTRICTIONS/PRECAUTIONS:   Pt is able to begin AAROM-AROM at this time.      Exercises/Interventions:   Therapeutic Ex Sets/sec Reps Notes HEP               pulleys 5'      iso 5 way 5'' 10     Cane press plus 2 10     Cane flexion/ER 2 10     SL ER/ABD 3 10 2#    Bicep curl 2 10 3#    TB row/ext, IR/ER 2 15 BTB    Towel S IR 5'' 10     Supine flexion 3 10 3#    No money 3 (31649) Provided manual therapy to mobilize soft tissue/joints of cervical/CT, scapular GHJ and UE for the purpose of modulating pain, promoting relaxation,  increasing ROM, reducing/eliminating soft tissue swelling/inflammation/restriction, improving soft tissue extensibility and allowing for proper ROM for normal function with self care, reaching, carrying, lifting, house/yardwork, driving/computer work    Modalities:  declined. Charges:  Timed Code Treatment Minutes: 40   Total Treatment Minutes: 40     [] EVAL (LOW) 99061 (typically 20 minutes face-to-face)    [] EVAL  [x] AH(35479) x  1   [] IONTO  [x] NMR (55793) x  1   [] VASO   [x] Manual (93873) x  1    [] Other:  [] TA x       [] Mech Traction (20223)  [] ES(attended) (88315)      [] ES (un) (87122):     Marcie Villa stated goal: Pt would like to return to pain free work activities.      Therapist goals for Patient:   Short Term Goals: To be achieved in: 2 weeks  1. Independent in HEP and progression per patient tolerance, in order to prevent re-injury. 2. Patient will have a decrease in pain to facilitate improvement in movement, function, and ADLs as indicated by Functional Deficits.     Long Term Goals: To be achieved in: 12 weeks  1. Disability index score of 20% or less for the DASH to assist with reaching prior level of function. 2. Patient will demonstrate increased AROM to L shoulder = to R shoulder to allow for proper joint functioning as indicated by patients Functional Deficits. 3. Patient will demonstrate an increase in Strength to 5/5 in all planes of L shoulder to allow for proper functional mobility as indicated by patients Functional Deficits. 4. Patient will return to all functional activities without increased symptoms or restriction.    5. Pt will demonstrate the ability to lift 5 lbs to shoulder height without UT compensations. (patient specific functional goal)     Progression Towards Functional goals:  [] Patient is

## 2017-11-14 ENCOUNTER — HOSPITAL ENCOUNTER (OUTPATIENT)
Dept: PHYSICAL THERAPY | Age: 57
Discharge: HOME | End: 2017-11-14
Admitting: ORTHOPAEDIC SURGERY

## 2017-11-14 NOTE — FLOWSHEET NOTE
[address] [place], [address]        Physical Therapy Daily Treatment Note  Date:  2017    Patient Name:  Jose Huynh    :  1960  MRN: [id number]  Restrictions/Precautions:    Medical/Treatment Diagnosis Information:  · Diagnosis: M75.122 (ICD-10-CM) - Complete rotator cuff tear of left shoulder  · Treatment Diagnosis: L shoulder pain, s/p L shoulder large RCR with SAD DOS 7/94/10  Insurance/Certification information:  PT Insurance Information: "[employer]  $10 CP  70/30 20 PT  Physician Information:  Referring Practitioner: Dr. Debora Fierro of care signed (Y/N):     Date of Patient follow up with Physician:     G-Code (if applicable):      Date G-Code Applied:         Progress Note: [x]  Yes  []  No  Next due by: Visit #10      Latex Allergy:  [x]NO      []YES  Preferred Language for Healthcare:   [x]English       []other:    Visit # Insurance Allowable   15 20      Pain level:  0/10     The pt is 8 weeks post-op on 10/17/17. The pt is 12 weeks post-op on 17. SUBJECTIVE:  Pt reports that his shoulder is a little sore from not doing his exercises this weekend. OBJECTIVE: Pt demonstrates UT compensations with AROM elevation and is unable to raise 5lbs to shoulder height. Observation:   Test measurements:  Flexion approx 148 deg   ER 55 deg at 45 deg of abd, Abd 130 deg           MMT at side in all planes 4/5         The pt is 8 weeks post-op on 10/17/2017. RESTRICTIONS/PRECAUTIONS:   Pt is able to begin AAROM-AROM at this time.      Exercises/Interventions:   Therapeutic Ex Sets/sec Reps Notes HEP               UE bike 5'      iso 5 way 5'' 10     Cane press plus 2 10     Cane flexion/ER 2 10     SL ER/ABD 3 10 2#    Bicep curl 2 10 3#    TB row/ext, IR/ER 2 15 BTB    Towel S IR 5'' 10     Supine flexion 3 10 3#    No money 3 10 RTB    Self ER S 5'' 10     Flexion in mirror 2 10 Cues to not compensate with Ut, able to get to 80 deg before compensation    Ball on wall 2 10     Triceps ext 2 10                   Manual Intervention       PROM flex/ER    10'                                              NMR re-education                                                                   Therapeutic Exercise and NMR EXR  [x] (67387) Provided verbal/tactile cueing for activities related to strengthening, flexibility, endurance, ROM  for improvements in scapular, scapulothoracic and UE control with self care, reaching, carrying, lifting, house/yardwork, driving/computer work.    [] (20454) Provided verbal/tactile cueing for activities related to improving balance, coordination, kinesthetic sense, posture, motor skill, proprioception  to assist with  scapular, scapulothoracic and UE control with self care, reaching, carrying, lifting, house/yardwork, driving/computer work. Therapeutic Activities:    [] (18332 or 81694) Provided verbal/tactile cueing for activities related to improving balance, coordination, kinesthetic sense, posture, motor skill, proprioception and motor activation to allow for proper function of scapular, scapulothoracic and UE control with self care, carrying, lifting, driving/computer work.      Home Exercise Program:    [x] (98968) Reviewed/Progressed HEP activities related to strengthening, flexibility, endurance, ROM of scapular, scapulothoracic and UE control with self care, reaching, carrying, lifting, house/yardwork, driving/computer work  [] (14133) Reviewed/Progressed HEP activities related to improving balance, coordination, kinesthetic sense, posture, motor skill, proprioception of scapular, scapulothoracic and UE control with self care, reaching, carrying, lifting, house/yardwork, driving/computer work      Manual Treatments:  PROM / STM / Oscillations-Mobs:  G-I, II, III, IV (PA's, Inf., Post.)  [] (13303) Provided manual therapy to mobilize soft

## 2017-11-17 ENCOUNTER — HOSPITAL ENCOUNTER (OUTPATIENT)
Dept: PHYSICAL THERAPY | Age: 57
Discharge: HOME | End: 2017-11-17
Admitting: ORTHOPAEDIC SURGERY

## 2017-11-17 NOTE — FLOWSHEET NOTE
[address] [place], [address]        Physical Therapy Daily Treatment Note  Date:  2017    Patient Name:  James Gaxiola    :  1960  MRN: [id number]  Restrictions/Precautions:    Medical/Treatment Diagnosis Information:  · Diagnosis: M75.122 (ICD-10-CM) - Complete rotator cuff tear of left shoulder  · Treatment Diagnosis: L shoulder pain, s/p L shoulder large RCR with SAD DOS 60  Insurance/Certification information:  PT Insurance Information: [employer]  $10 CP  70/30 20 PT  Physician Information:  Referring Practitioner: Dr. Quinonez Kingdom of care signed (Y/N):     Date of Patient follow up with Physician:     G-Code (if applicable):      Date G-Code Applied:         Progress Note: [x]  Yes  []  No  Next due by: Visit #10      Latex Allergy:  [x]NO      []YES  Preferred Language for Healthcare:   [x]English       []other:    Visit # Insurance Allowable   16 20      Pain level:  0/10     The pt is 8 weeks post-op on 10/17/17. The pt is 12 weeks post-op on 17. SUBJECTIVE:  Pt reports some mild soreness at this time and states he has returned to work with minimal issues. OBJECTIVE: Pt demonstrates UT compensations with AROM elevation and is unable to raise 5lbs to shoulder height. Observation:   Test measurements:  Flexion approx 148 deg   ER 70 deg at 90 deg of abd, Abd 140 deg           MMT at side in all planes 4/5        RESTRICTIONS/PRECAUTIONS:   Pt is able to begin AAROM-AROM at this time.      Exercises/Interventions:   Therapeutic Ex Sets/sec Reps Notes HEP               UE bike 5'      iso 5 way 5'' 10     Cane press plus 2 10     Cane flexion/ER 2 10     SL ER/ABD 3 10 2#    Bicep curl 2 10 6#    TB row/ext, IR/ER 2 15 BTB    Towel S IR 5'' 10     Supine flexion 3 10 3#    No money 3 10 BTB    Self ER S 5'' 10     Flexion/scaption in mirror 1# 2 10 Cues to not compensate with Ut, able to get to 80 deg before compensation    Ball on wall 2 10     Triceps ext 2 10     CC tricep ext  rows 3 10 35#  35#    Wall push ups 2 10                                        Manual Intervention       PROM   10'                                              NMR re-education                                                                   Therapeutic Exercise and NMR EXR  [x] (48958) Provided verbal/tactile cueing for activities related to strengthening, flexibility, endurance, ROM  for improvements in scapular, scapulothoracic and UE control with self care, reaching, carrying, lifting, house/yardwork, driving/computer work.    [] (23340) Provided verbal/tactile cueing for activities related to improving balance, coordination, kinesthetic sense, posture, motor skill, proprioception  to assist with  scapular, scapulothoracic and UE control with self care, reaching, carrying, lifting, house/yardwork, driving/computer work. Therapeutic Activities:    [] (15497 or 89599) Provided verbal/tactile cueing for activities related to improving balance, coordination, kinesthetic sense, posture, motor skill, proprioception and motor activation to allow for proper function of scapular, scapulothoracic and UE control with self care, carrying, lifting, driving/computer work.      Home Exercise Program:    [x] (11552) Reviewed/Progressed HEP activities related to strengthening, flexibility, endurance, ROM of scapular, scapulothoracic and UE control with self care, reaching, carrying, lifting, house/yardwork, driving/computer work  [] (46486) Reviewed/Progressed HEP activities related to improving balance, coordination, kinesthetic sense, posture, motor skill, proprioception of scapular, scapulothoracic and UE control with self care, reaching, carrying, lifting, house/yardwork, driving/computer work      Manual Treatments:  PROM / STM / Oscillations-Mobs:  G-I, II, III, IV (PA's, Inf., Post.)  [] (82551) Provided manual therapy to mobilize soft tissue/joints of cervical/CT, scapular GHJ and UE for the purpose of modulating pain, promoting relaxation,  increasing ROM, reducing/eliminating soft tissue swelling/inflammation/restriction, improving soft tissue extensibility and allowing for proper ROM for normal function with self care, reaching, carrying, lifting, house/yardwork, driving/computer work    Modalities:  declined. Charges:  Timed Code Treatment Minutes: 40   Total Treatment Minutes: 40     [] EVAL (LOW) 37062 (typically 20 minutes face-to-face)    [] EVAL  [x] KN(12324) x  1   [] IONTO  [x] NMR (12375) x  1   [] VASO   [x] Manual (13765) x  1    [] Other:  [] TA x       [] Mech Traction (25523)  [] ES(attended) (12595)      [] ES (un) (79284):     Brandon Ohara stated goal: Pt would like to return to pain free work activities.      Therapist goals for Patient:   Short Term Goals: To be achieved in: 2 weeks  1. Independent in HEP and progression per patient tolerance, in order to prevent re-injury. 2. Patient will have a decrease in pain to facilitate improvement in movement, function, and ADLs as indicated by Functional Deficits.     Long Term Goals: To be achieved in: 12 weeks  1. Disability index score of 20% or less for the DASH to assist with reaching prior level of function. 2. Patient will demonstrate increased AROM to L shoulder = to R shoulder to allow for proper joint functioning as indicated by patients Functional Deficits. 3. Patient will demonstrate an increase in Strength to 5/5 in all planes of L shoulder to allow for proper functional mobility as indicated by patients Functional Deficits. 4. Patient will return to all functional activities without increased symptoms or restriction.    5. Pt will demonstrate the ability to lift 5 lbs to shoulder height without UT compensations. (patient specific functional goal)     Progression Towards Functional goals:  [x] Patient is progressing as expected towards functional goals listed. [] Progression is slowed due to complexities listed. [] Progression has been slowed due to co-morbidities. [] Plan just implemented, too soon to assess goals progression  [] Other:     ASSESSMENT:  Patient has improved range of motion but continues to demonstrate upper trap compensations with flexion and abduction. He is able to correct with verbal and visual cues up to 90 deg.     Treatment/Activity Tolerance:  [x] Patient tolerated treatment well [] Patient limited by fatique  [] Patient limited by pain  [] Patient limited by other medical complications  [] Other:     Prognosis: [x] Good [] Fair  [] Poor    Patient Requires Follow-up: [x] Yes  [] No    PLAN: See eval  [x] Continue per plan of care [] Alter current plan (see comments)  [] Plan of care initiated [] Hold pending MD visit [] Discharge    Electronically signed by: Karri Laws PT,DPT

## 2017-11-21 ENCOUNTER — HOSPITAL ENCOUNTER (OUTPATIENT)
Dept: PHYSICAL THERAPY | Age: 57
Discharge: HOME | End: 2017-11-21
Admitting: ORTHOPAEDIC SURGERY

## 2017-11-21 NOTE — FLOWSHEET NOTE
[address] [place], [address]        Physical Therapy Daily Treatment Note  Date:  2017    Patient Name:  Godfrey Davis    :  1960  MRN: [id number]  Restrictions/Precautions:    Medical/Treatment Diagnosis Information:  · Diagnosis: M75.122 (ICD-10-CM) - Complete rotator cuff tear of left shoulder  · Treatment Diagnosis: L shoulder pain, s/p L shoulder large RCR with SAD DOS 89  Insurance/Certification information:  PT Insurance Information: [employer]  $10 CP  70/30 20 PT  Physician Information:  Referring Practitioner: Dr. Zuleyma Colón of care signed (Y/N):     Date of Patient follow up with Physician:     G-Code (if applicable):      Date G-Code Applied:         Progress Note: [x]  Yes  []  No  Next due by: Visit #10      Latex Allergy:  [x]NO      []YES  Preferred Language for Healthcare:   [x]English       []other:    Visit # Insurance Allowable   17 20      Pain level:  2/10     The pt is 8 weeks post-op on 10/17/17. The pt is 12 weeks post-op on 17. SUBJECTIVE:  Pt reports some soreness due to working long days. OBJECTIVE: Pt demonstrates UT compensations with AROM elevation and is unable to raise 5lbs to shoulder height. Observation:   Test measurements:  Flexion approx 148 deg   ER 70 deg at 90 deg of abd, Abd 140 deg           MMT at side in all planes 4/5        RESTRICTIONS/PRECAUTIONS:   Pt is able to begin AAROM-AROM at this time.      Exercises/Interventions:   Therapeutic Ex Sets/sec Reps Notes HEP               UE bike 5'      iso 5 way 5'' 10     Cane press plus 2 10     Cane flexion/ER 2 10     SL ER/ABD 3 10 2#    Bicep curl 2 10 6#    TB row/ext, IR/ER 2 15 BTB    Towel S IR 5'' 10     Supine flexion 3 10 3#    No money 3 10 BTB    Self ER S 5'' 10     Flexion/scaption in mirror 2# 2 10 Cues to not compensate with Ut, able to get to 80 deg before to mobilize soft tissue/joints of cervical/CT, scapular GHJ and UE for the purpose of modulating pain, promoting relaxation,  increasing ROM, reducing/eliminating soft tissue swelling/inflammation/restriction, improving soft tissue extensibility and allowing for proper ROM for normal function with self care, reaching, carrying, lifting, house/yardwork, driving/computer work    Modalities:  declined. Charges:  Timed Code Treatment Minutes: 30   Total Treatment Minutes: 30     [] EVAL (LOW) 21870 (typically 20 minutes face-to-face)    [] EVAL  [x] DY(36105) x  1   [] IONTO  [x] NMR (04485) x  1   [] VASO   [] Manual (49893) x       [] Other:  [] TA x       [] Mech Traction (10033)  [] ES(attended) (15392)      [] ES (un) (24648):     Skeet Ards stated goal: Pt would like to return to pain free work activities.      Therapist goals for Patient:   Short Term Goals: To be achieved in: 2 weeks  1. Independent in HEP and progression per patient tolerance, in order to prevent re-injury. 2. Patient will have a decrease in pain to facilitate improvement in movement, function, and ADLs as indicated by Functional Deficits.     Long Term Goals: To be achieved in: 12 weeks  1. Disability index score of 20% or less for the DASH to assist with reaching prior level of function. 2. Patient will demonstrate increased AROM to L shoulder = to R shoulder to allow for proper joint functioning as indicated by patients Functional Deficits. 3. Patient will demonstrate an increase in Strength to 5/5 in all planes of L shoulder to allow for proper functional mobility as indicated by patients Functional Deficits. 4. Patient will return to all functional activities without increased symptoms or restriction.    5. Pt will demonstrate the ability to lift 5 lbs to shoulder height without UT compensations. (patient specific functional goal)     Progression Towards Functional goals:  [x] Patient is progressing as expected towards

## 2017-11-28 ENCOUNTER — HOSPITAL ENCOUNTER (OUTPATIENT)
Dept: PHYSICAL THERAPY | Age: 57
Discharge: HOME | End: 2017-11-28
Admitting: ORTHOPAEDIC SURGERY

## 2017-11-28 NOTE — FLOWSHEET NOTE
[address] [place], [address]        Physical Therapy Daily Treatment Note  Date:  2017    Patient Name:  Melanie Sifuentes    :  1960  MRN: [id number]  Restrictions/Precautions:    Medical/Treatment Diagnosis Information:  · Diagnosis: M75.122 (ICD-10-CM) - Complete rotator cuff tear of left shoulder  · Treatment Diagnosis: L shoulder pain, s/p L shoulder large RCR with SAD DOS 64  Insurance/Certification information:  PT Insurance Information: [employer]  $10 CP  70/30 20 PT  Physician Information:  Referring Practitioner: Dr. Waylon Maria of care signed (Y/N):     Date of Patient follow up with Physician:     G-Code (if applicable):      Date G-Code Applied:         Progress Note: [x]  Yes  []  No  Next due by: Visit #10      Latex Allergy:  [x]NO      []YES  Preferred Language for Healthcare:   [x]English       []other:    Visit # Insurance Allowable   18 20      Pain level:  10     The pt is 8 weeks post-op on 10/17/17. The pt is 12 weeks post-op on 17. SUBJECTIVE:  Pt reports he has some muscle soreness. OBJECTIVE: Pt demonstrates UT compensations with AROM elevation and is unable to raise 5lbs to shoulder height. Observation:   Test measurements:  Flexion approx 148 deg   ER 70 deg at 90 deg of abd, Abd 140 deg           MMT at side in all planes 4/5        RESTRICTIONS/PRECAUTIONS:   Pt is able to begin AAROM-AROM at this time.      Exercises/Interventions:   Therapeutic Ex Sets/sec Reps Notes HEP               UE bike 5'  Fwd/bkw    iso 5 way 5'' 10     Cane press plus 2 10     Cane flexion/ER 2 10     SL ER/ABD 3 10 2#    Bicep curl 2 10 6#    TB row/ext, IR/ER 2 15 BTB    Towel S IR 5'' 10     Supine flexion 3 10 3#    No money 3 10 BTB    Self ER S 5'' 10     Flexion/scaption in mirror 2# 2 10 Cues to not compensate with Ut, able to get to 80 deg before

## 2017-12-01 ENCOUNTER — HOSPITAL ENCOUNTER (OUTPATIENT)
Dept: PHYSICAL THERAPY | Age: 57
Discharge: HOME | End: 2017-12-31
Attending: ORTHOPAEDIC SURGERY | Admitting: ORTHOPAEDIC SURGERY

## 2017-12-28 ENCOUNTER — HOSPITAL ENCOUNTER (OUTPATIENT)
Dept: PHYSICAL THERAPY | Age: 57
Discharge: HOME | End: 2017-12-28
Admitting: ORTHOPAEDIC SURGERY

## 2017-12-28 NOTE — FLOWSHEET NOTE
Physical Therapist Assistant Activity Sheet  Date:  2017    Patient Name:  Ta Pate    :  1960  MRN: [id number]  Restrictions/Precautions:    Medical/Treatment Diagnosis Information:  ·   Diagnosis: M75.122 (ICD-10-CM) - Complete rotator cuff tear of left shoulder  ·   Treatment Diagnosis: L shoulder pain, s/p L shoulder large RCR with SAD DOS 17  Physician Information:    Referring Practitioner: Dr. Kevin Mars                      Activities                                                          DOS/DOI:                                                         Date: 17         [place]      Chop                           Chest                           ER Flip            Long/Short lever  Flex. Scap.                                   ABd.             punch            Body/Cardio Blade Flex/Ext                                      IR/ER                                      ABd/ADd            Push-up      Plus                             Wall    X 30                       Table                          Floor            No Money/HAB-HAD/Stance            Ball on Wall  4 ways 2 x 10 4 ways x 30               Tramp            Theraband    Rows/Ext Green x 30 ea Green x 30 ea                          IR Green x 30 Green x 30 Black x 30                         ER Red x 30 Green x 30 Blue x 30                         No money Red x 30 Red x 30 Green x 30                         Horiz. ABd                            Biceps 2# x 30 3# x 30                          Triceps                            Punches            Cable Column   Rows                                 Ext.                                  Lat. Pulldown                                 Biceps                                 Triceps            IR Strap Stretch 5\" x 10     Wall Slide  10\" x 15                Modality declined     PTA Assessment Introduced TB ex today,

## 2018-01-01 ENCOUNTER — HOSPITAL ENCOUNTER (OUTPATIENT)
Dept: PHYSICAL THERAPY | Age: 58
Discharge: HOME | End: 2018-01-31
Attending: ORTHOPAEDIC SURGERY | Admitting: ORTHOPAEDIC SURGERY

## 2018-01-03 ENCOUNTER — OFFICE VISIT (OUTPATIENT)
Dept: ORTHOPEDIC SURGERY | Age: 58
End: 2018-01-03

## 2018-01-03 VITALS
DIASTOLIC BLOOD PRESSURE: 76 MMHG | WEIGHT: 214 LBS | HEIGHT: 72 IN | BODY MASS INDEX: 28.99 KG/M2 | SYSTOLIC BLOOD PRESSURE: 129 MMHG | HEART RATE: 81 BPM

## 2018-01-03 DIAGNOSIS — M75.122 COMPLETE ROTATOR CUFF TEAR OF LEFT SHOULDER: Primary | ICD-10-CM

## 2018-01-03 PROCEDURE — 99213 OFFICE O/P EST LOW 20 MIN: CPT | Performed by: ORTHOPAEDIC SURGERY

## 2018-01-03 PROCEDURE — 1036F TOBACCO NON-USER: CPT | Performed by: ORTHOPAEDIC SURGERY

## 2018-01-03 PROCEDURE — G8427 DOCREV CUR MEDS BY ELIG CLIN: HCPCS | Performed by: ORTHOPAEDIC SURGERY

## 2018-01-03 PROCEDURE — G8484 FLU IMMUNIZE NO ADMIN: HCPCS | Performed by: ORTHOPAEDIC SURGERY

## 2018-01-03 PROCEDURE — 3017F COLORECTAL CA SCREEN DOC REV: CPT | Performed by: ORTHOPAEDIC SURGERY

## 2018-01-03 PROCEDURE — G8417 CALC BMI ABV UP PARAM F/U: HCPCS | Performed by: ORTHOPAEDIC SURGERY

## 2018-01-03 NOTE — PROGRESS NOTES
Chief Complaint  Shoulder Pain (LEFT        SX 08/22/2017)    Post op left shoulder arthroscopy with large rotator cuff repair and subacromial decompression  Date of surgery: 08/22/2017       History of Present Illness:  Clementeen Schwab is a 59 y.o. male  Here for follow up of left shoulder arthroscopy with large rotator cuff repair and subacromial decompression. Progressing well. Continues with outpatient physical therapy (1x/wk), but thinks he is done at this point. He is continuing his HEP. The patient's pain is rated at 1-3/10. The patient denies fever, wound drainage, increasing redness, pus, increasing swelling. Post op problems reported: none. Patient is taking Ibuprofen on a p.r.n. basis and continues to use his ice machine which helps a great deal.  At the patient's last appointment at the beginning of November we got him started back to work on light duty however he reports he is essentially working full duty. Vital Signs  Vitals:    01/03/18 1413   BP: 129/76   Pulse: 81       Pain Assessment          Shoulder Examination  Patient is awake, alert, and in no acute distress. Portals have healed well. Skin is intact. No swelling, ecchymosis or erythema. Distal circulatory status and neurologic status is intact. The wound is clean, dry, healed. There is no warmth, erythema, or purulent drainage over the incision. Sensation is intact to light touch in the axillary, median, radial, and ulnar nerve distribution. The EPL, FPL, and interossei are grossly intact. The left upper extremity is warm and well-perfused with brisk capillary refill. ROM has been progressing, but is still stiff. Forward flexion to approximately 140°, abduction to approximately 120°  Adequate strength noted. Rotator cuff appears to be intact, just weak.       None  Diagnostic Test Findings: No new xrays were taken today        Assessment: Progressing well post op from left shoulder arthroscopy with large rotator cuff

## 2019-02-22 ENCOUNTER — HOSPITAL ENCOUNTER (OUTPATIENT)
Age: 59
Discharge: HOME | End: 2019-02-22
Payer: COMMERCIAL

## 2019-02-22 ENCOUNTER — HOSPITAL ENCOUNTER (OUTPATIENT)
Dept: GENERAL RADIOLOGY | Age: 59
Discharge: HOME | End: 2019-02-22
Payer: COMMERCIAL

## 2019-02-22 DIAGNOSIS — M25.461 SWELLING OF JOINT, KNEE, RIGHT: ICD-10-CM

## 2019-02-22 PROCEDURE — 73562 X-RAY EXAM OF KNEE 3: CPT

## 2019-02-25 ENCOUNTER — OFFICE VISIT (OUTPATIENT)
Dept: ORTHOPEDIC SURGERY | Age: 59
End: 2019-02-25
Payer: COMMERCIAL

## 2019-02-25 VITALS
SYSTOLIC BLOOD PRESSURE: 131 MMHG | HEIGHT: 72 IN | HEART RATE: 87 BPM | BODY MASS INDEX: 31.02 KG/M2 | DIASTOLIC BLOOD PRESSURE: 82 MMHG | WEIGHT: 229 LBS

## 2019-02-25 DIAGNOSIS — M25.561 PAIN IN JOINT OF RIGHT KNEE: Primary | ICD-10-CM

## 2019-02-25 PROCEDURE — G8417 CALC BMI ABV UP PARAM F/U: HCPCS | Performed by: PHYSICIAN ASSISTANT

## 2019-02-25 PROCEDURE — 1036F TOBACCO NON-USER: CPT | Performed by: PHYSICIAN ASSISTANT

## 2019-02-25 PROCEDURE — 3017F COLORECTAL CA SCREEN DOC REV: CPT | Performed by: PHYSICIAN ASSISTANT

## 2019-02-25 PROCEDURE — G8484 FLU IMMUNIZE NO ADMIN: HCPCS | Performed by: PHYSICIAN ASSISTANT

## 2019-02-25 PROCEDURE — G8427 DOCREV CUR MEDS BY ELIG CLIN: HCPCS | Performed by: PHYSICIAN ASSISTANT

## 2019-02-25 PROCEDURE — 99213 OFFICE O/P EST LOW 20 MIN: CPT | Performed by: PHYSICIAN ASSISTANT

## 2019-03-21 ENCOUNTER — OFFICE VISIT (OUTPATIENT)
Dept: ORTHOPEDIC SURGERY | Age: 59
End: 2019-03-21
Payer: COMMERCIAL

## 2019-03-21 VITALS — WEIGHT: 229.06 LBS | HEIGHT: 72 IN | BODY MASS INDEX: 31.03 KG/M2

## 2019-03-21 DIAGNOSIS — M25.561 PAIN IN JOINT OF RIGHT KNEE: Primary | ICD-10-CM

## 2019-03-21 DIAGNOSIS — M25.461 EFFUSION, RIGHT KNEE: ICD-10-CM

## 2019-03-21 PROCEDURE — 99214 OFFICE O/P EST MOD 30 MIN: CPT | Performed by: ORTHOPAEDIC SURGERY

## 2019-03-21 PROCEDURE — 20610 DRAIN/INJ JOINT/BURSA W/O US: CPT | Performed by: ORTHOPAEDIC SURGERY

## 2019-03-21 RX ORDER — NAPROXEN 500 MG/1
500 TABLET ORAL 2 TIMES DAILY WITH MEALS
Qty: 60 TABLET | Refills: 2 | Status: SHIPPED | OUTPATIENT
Start: 2019-03-21 | End: 2020-12-15

## 2019-04-29 ENCOUNTER — OFFICE VISIT (OUTPATIENT)
Dept: ORTHOPEDIC SURGERY | Age: 59
End: 2019-04-29
Payer: COMMERCIAL

## 2019-04-29 VITALS — HEIGHT: 72 IN | WEIGHT: 229 LBS | BODY MASS INDEX: 31.02 KG/M2

## 2019-04-29 DIAGNOSIS — M25.461 EFFUSION, RIGHT KNEE: ICD-10-CM

## 2019-04-29 DIAGNOSIS — M25.561 PAIN IN JOINT OF RIGHT KNEE: Primary | ICD-10-CM

## 2019-04-29 PROCEDURE — G8417 CALC BMI ABV UP PARAM F/U: HCPCS | Performed by: ORTHOPAEDIC SURGERY

## 2019-04-29 PROCEDURE — 99213 OFFICE O/P EST LOW 20 MIN: CPT | Performed by: ORTHOPAEDIC SURGERY

## 2019-04-29 PROCEDURE — 3017F COLORECTAL CA SCREEN DOC REV: CPT | Performed by: ORTHOPAEDIC SURGERY

## 2019-04-29 PROCEDURE — G8427 DOCREV CUR MEDS BY ELIG CLIN: HCPCS | Performed by: ORTHOPAEDIC SURGERY

## 2019-04-29 PROCEDURE — 20610 DRAIN/INJ JOINT/BURSA W/O US: CPT | Performed by: ORTHOPAEDIC SURGERY

## 2019-04-29 PROCEDURE — 1036F TOBACCO NON-USER: CPT | Performed by: ORTHOPAEDIC SURGERY

## 2019-04-29 NOTE — PROGRESS NOTES
SITE:  RIGHT KNEE    MARCAINE  NDC# [id number]  LOT NUMBER#  [id number]    DEPO 40MG  NDC# 5676-0046-86  LOT NUMBER#  [id number]    LIDOCAINE    NDC# 9716-9613-33  LOT NUMBER#  [id number]

## 2019-04-29 NOTE — PROGRESS NOTES
[facility] [place], [place]         Orthopaedic Surgery and Sports Medicine      Patient Name: Rogers Singh  YOB: 1960  Date of Encounter: 4/29/2019  Patient's PCP is Elizabeth Underwood DO    SUBJECTIVE  Chief Complaint:  Knee Pain (RIGHT     )      History of Present Illness:  Rogers Singh is a 62 y.o. male here regarding right knee pain. He was recently seen in the after hours clinic on 2/25/2019 , he reportedly twisted his knee a week prior to that visit and develoepd a large effusion. Felt to most likely have a meniscal tear and was referred for an MRI scan. At his last visit we went over the MRI scan which showed a posterior horn medial meniscus tear with possible dementia root. At that time we aspirated and injected his knee which he reports helped a great deal until recently. He is effusion has returned. The patient is currently ambulating independently. History of swelling: Yes  History of \"giving way\": Yes  History of instability: Yes  History of popping and/or catching: Yes    The pain is located global.   The patient describes the symptoms as aching and sharp. He rates pain at 7/10. Symptoms improve with rest, ice. The symptoms are made worse with activity, stair climbing, kneeling, deep knee bending. Sleep pattern is affected by the chief complaint: Yes    The patient has not had PT. The patient has not had an injection. The patient has taken NSAIDs, and is currently taking Naprosyn 500 mg b.i.d. The patient is working. He also has a contracted sleeping he been wearing which helps.       Pain Assessment:       Past Medical History:  Past Medical History:   Diagnosis Date    Acid reflux     Diabetes mellitus ([address]     \"prediabetic\"    HTN (hypertension)     Hyperlipidemia        Past Surgical History:  Past Surgical History:   Procedure Laterality Date    DENTAL SURGERY      DENTAL SURGERY      extractions    EYE SURGERY Bilateral     lasik    SHOULDER ARTHROSCOPY Left 08/22/2017    rotator cuff repair, 2 tendon decompression, debridement    TONSILLECTOMY AND ADENOIDECTOMY         Allergies: Allergies   Allergen Reactions    No Known Allergies        Medications:  Current Outpatient Medications   Medication Sig Dispense Refill    naproxen (NAPROSYN) 500 MG tablet Take 1 tablet by mouth 2 times daily (with meals) 60 tablet 2    aspirin 81 MG tablet Take 81 mg by mouth daily      oxyCODONE-acetaminophen (PERCOCET) 5-325 MG per tablet Take 1-2 tablets by mouth every 6 hours as needed for Pain . 40 tablet 0    metFORMIN (GLUCOPHAGE) 500 MG tablet Take 500 mg by mouth 2 times daily (with meals)      atorvastatin (LIPITOR) 20 MG tablet Take 20 mg by mouth daily      losartan (COZAAR) 50 MG tablet Take 50 mg by mouth daily      Esomeprazole Magnesium (NEXIUM PO) Take 20 mg by mouth        No current facility-administered medications for this visit. Review of Systems:  Justin Navarro's review of systems has been performed by intake and observation. All past and current ROS forms have been scanned into the medical record. She has been instructed to contact her primary care provider regarding ROS issues if not already being addressed at this time. There are no recent changes. The most recent ROS was scanned into media on 02/25/2019       OBJECTIVE  PHYSICAL EXAM  Vital Signs: There were no vitals filed for this visit. Body mass index is 31.06 kg/m². General Exam:   Constitutional: Patient is adequately groomed with no evidence of malnutrition  Mental Status: The patient is oriented to time, place and person. The patient's mood and affect are appropriate. Lymphatic: The lymphatic examination bilaterally reveals all areas to be without enlargement or induration. Vascular: Examination reveals no swelling or calf tenderness. Peripheral pulses are palpable and 2+. Neurological: The patient has good coordination. There is no weakness or sensory deficit. Right Knee Examination  Inspection:   Knee alignment: neutral  severe swelling noted. No erythema or ecchymosis. Skin is intact with no cellulitis, rashes, ulcerations, lymphedema or cutaneous lesions noted. Gait: abnormal and antalgic. The patient can bear weight on the extremity. Palpation: Mild tenderness to palpation on the global aspect of the knee. a large effusion noted. Range of Motion:  full    Special Tests:  Lachman test: negative       Anterior drawer: negative       Posterior drawer: negative       Xochitl's test: positive       Varus laxity at 30 degrees: negative       Valgus laxity at 30 degrees: negative    Strength: no gross motor weakness noted. Motor exam of the lower extremities show quadriceps, hamstrings, foot dorsiflexion and plantarflexion grossly intact. Neurologic & vascular: Sensation to both feet is grossly intact to light touch. The bilateral lower extremities are warm and well-perfused with brisk capillary refill. Additional Examinations:  Left Lower Extremity: Examination of the left lower extremity does not show any tenderness, deformity or injury. Range of motion is within normal limits. There is no gross instability. There are no rashes, ulcerations or lesions. Strength and tone are normal.      DIAGNOSTICS  Xrays obtained in office today: no  Xrays reviewed today: Yes, radiographs from the after hours clinic  4 views of the right knee show   Fracture: No  Dislocation: No  Knee joint arthritis: mild  Medial compartment: mild  Lateral compartment: none  Patellofemoral compartment: mild  Patellar tilt:No  Varus deformity: No  Valgus deformity:No     The after hours clinic and MRI scan of his knee was ordered.   MRI scan from March 6, 2018 shows he does appear to have a posterior horn medial meniscus tear with possible damage to the root. 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 He has a chronic sprain of his MCL. ASSESSMENT (Medical Decision Making)    Nargis Valentin is a 62 y.o. male with the following diagnosis: large right knee effusion with evidence of medial meniscus pathology. ICD-10-CM    1. Pain in joint of right knee M25.561    2. Effusion, right knee M25.461        His overall course is unchanged despite conservative treatment      PLAN (Medical Decision Making)  Office Procedures:  No orders of the defined types were placed in this encounter. The risks and benefits of an injection were discussed with the patient. The patient had full opportunity to ask questions and all were answered. The patient then provided verbal informed consent. The skin was then prepped with betadine solution and alcohol. Under aseptic conditions, the  right knee was injected with 2cc of 1% xylocaine, then approx 100ml of clear yellow fluid was aspirated. Then a mixture of 2cc of 0.25% marcaine and 1cc (40 mg) of Depomedrol was injected. There were no immediate complications following the injection. The patient was advised of the possibility of injection site reaction and instructed to apply ice to the area. Treatment Plan:    I discussed the diagnosis and treatment options with Nargis Valentin today. Today, we'll plan on aspirating and injecting the patient's right knee with a small dose cortisone injection. He is going to continue his Naprosyn and rhythm tried hard to remember to take it twice a day. He is also going to consider surgical intervention later on this summer after his trip. He will call to schedule. Non-steroidal anti-inflammatories medications (NSAIDs) can be used to assist with pain control and to reduce inflammatory changes. These medications may be over-the-counter or prescribed. We discussed taking the NSAID properly and the precautions.   The patient understands that this medication may potentially interfere with other medications. Patient was also instructed to immediately discontinue the medication is there is any possible complication. Ani Keane was instructed to call the office if his symptoms worsen or if new symptoms appear prior to the next scheduled visit. He is specifically instructed to contact the office between now and schedule appointment if he has concerns related to his condition or if he needs assistance in scheduling any above tests. He is welcome to call for an appointment sooner if he has any additional concerns or questions. Lillian Smith PA-C, scribing for and in the presence of Dr. Carlos Mohan   4/29/2019 9:31 AM    I have evaluated the patient myself and completed the examination of the patient on date of visit. I have discussed the case and reviewed all pertinent data with the Lillian ARADNA, and agree with the plan noted above. Dr. Carlos Mohan MD        This dictation was performed with a verbal recognition program [facility]) and it was checked for errors. It is possible that there are still dictated errors within this office note. If so, please bring any errors to my attention for an addendum. All efforts were made to ensure that this office note is accurate.

## 2019-06-07 DIAGNOSIS — S83.231D COMPLEX TEAR OF MEDIAL MENISCUS OF RIGHT KNEE AS CURRENT INJURY, SUBSEQUENT ENCOUNTER: Primary | ICD-10-CM

## 2019-07-02 ENCOUNTER — TELEPHONE (OUTPATIENT)
Dept: ORTHOPEDIC SURGERY | Age: 59
End: 2019-07-02

## 2019-07-02 NOTE — PROGRESS NOTES
Laura Waqas    Age 62 y.o.    male    1960    MRN [id number]    Date___________   Arrival Time_____________  OR Time____________Duration____     Procedure(s):  VIDEO ARTHROSCOPY RIGHT KNEE, PARTIAL MEDIAL MENISCECTOMY, CHONDROPLASTY    Surgeon  ________________________________  [address]       Phone [phone] (home) [phone] (work)      [address]  Cell Work  ______________________________________________________________________________________________________________________________________________________________________________________________________________________________________________________________________________________________________________________________________________________________    PCP__________________________Phone__________________________________      H&P__________________Bringing      Chart              Epic    DOS           Called_______  EKG__________________Bringing      Chart              Epic    DOS           Called_______  LAB__________________ Bringing      Chart              Epic    DOS           Called_______  CardiacClearance _______Bringing      Chart              Epic    DOS           Called_______      Cardiologist________________________ Phone___________________________      ? Congregational concerns / Waiver on Chart            PAT Communications________________  ? Pre-op Instructions Given [place]          _________________________________  ? Directions to Surgery Center                          _________________________________  ? Transportation Home_______________      _________________________________  ?  Crutches/Walker__________________        _________________________________      ________Pre-op Orders   _______Transcribed    _______Wt.  ________Pharmacy          _______SCD  ______VTE     ______Beta Blocker  ________Consent             ________SUSAN Edwards

## 2019-07-10 ENCOUNTER — ANESTHESIA EVENT (OUTPATIENT)
Dept: OPERATING ROOM | Age: 59
End: 2019-07-10
Payer: COMMERCIAL

## 2019-07-10 ASSESSMENT — LIFESTYLE VARIABLES: SMOKING_STATUS: 0

## 2019-07-11 ENCOUNTER — ANESTHESIA (OUTPATIENT)
Dept: OPERATING ROOM | Age: 59
End: 2019-07-11
Payer: COMMERCIAL

## 2019-07-11 ENCOUNTER — HOSPITAL ENCOUNTER (OUTPATIENT)
Age: 59
Setting detail: OUTPATIENT SURGERY
Discharge: HOME | End: 2019-07-11
Attending: ORTHOPAEDIC SURGERY | Admitting: ORTHOPAEDIC SURGERY
Payer: COMMERCIAL

## 2019-07-11 VITALS
HEART RATE: 83 BPM | SYSTOLIC BLOOD PRESSURE: 121 MMHG | RESPIRATION RATE: 18 BRPM | TEMPERATURE: 96.8 F | WEIGHT: 237 LBS | DIASTOLIC BLOOD PRESSURE: 81 MMHG | OXYGEN SATURATION: 93 % | HEIGHT: 72 IN | BODY MASS INDEX: 32.1 KG/M2

## 2019-07-11 VITALS
SYSTOLIC BLOOD PRESSURE: 138 MMHG | RESPIRATION RATE: 13 BRPM | OXYGEN SATURATION: 98 % | DIASTOLIC BLOOD PRESSURE: 76 MMHG

## 2019-07-11 DIAGNOSIS — Z98.890 S/P RIGHT KNEE ARTHROSCOPY: Primary | ICD-10-CM

## 2019-07-11 DIAGNOSIS — S83.8X1A ACUTE MEDIAL MENISCAL INJURY OF KNEE, RIGHT, INITIAL ENCOUNTER: ICD-10-CM

## 2019-07-11 LAB
GLUCOSE BLD-MCNC: 114 MG/DL (ref 70–99)
GLUCOSE BLD-MCNC: 121 MG/DL (ref 70–99)
PERFORMED ON: ABNORMAL
PERFORMED ON: ABNORMAL

## 2019-07-11 PROCEDURE — 3700000001 HC ADD 15 MINUTES (ANESTHESIA): Performed by: ORTHOPAEDIC SURGERY

## 2019-07-11 PROCEDURE — 2709999900 HC NON-CHARGEABLE SUPPLY: Performed by: ORTHOPAEDIC SURGERY

## 2019-07-11 PROCEDURE — 6370000000 HC RX 637 (ALT 250 FOR IP): Performed by: ANESTHESIOLOGY

## 2019-07-11 PROCEDURE — 7100000000 HC PACU RECOVERY - FIRST 15 MIN: Performed by: ORTHOPAEDIC SURGERY

## 2019-07-11 PROCEDURE — 2580000003 HC RX 258: Performed by: ORTHOPAEDIC SURGERY

## 2019-07-11 PROCEDURE — 3600000004 HC SURGERY LEVEL 4 BASE: Performed by: ORTHOPAEDIC SURGERY

## 2019-07-11 PROCEDURE — 3600000014 HC SURGERY LEVEL 4 ADDTL 15MIN: Performed by: ORTHOPAEDIC SURGERY

## 2019-07-11 PROCEDURE — 6360000002 HC RX W HCPCS: Performed by: NURSE ANESTHETIST, CERTIFIED REGISTERED

## 2019-07-11 PROCEDURE — 2500000003 HC RX 250 WO HCPCS: Performed by: NURSE ANESTHETIST, CERTIFIED REGISTERED

## 2019-07-11 PROCEDURE — 2580000003 HC RX 258: Performed by: NURSE ANESTHETIST, CERTIFIED REGISTERED

## 2019-07-11 PROCEDURE — 2580000003 HC RX 258: Performed by: ANESTHESIOLOGY

## 2019-07-11 PROCEDURE — 6360000002 HC RX W HCPCS: Performed by: ORTHOPAEDIC SURGERY

## 2019-07-11 PROCEDURE — 6360000002 HC RX W HCPCS

## 2019-07-11 PROCEDURE — 7100000001 HC PACU RECOVERY - ADDTL 15 MIN: Performed by: ORTHOPAEDIC SURGERY

## 2019-07-11 PROCEDURE — 2500000003 HC RX 250 WO HCPCS: Performed by: ANESTHESIOLOGY

## 2019-07-11 PROCEDURE — 7100000011 HC PHASE II RECOVERY - ADDTL 15 MIN: Performed by: ORTHOPAEDIC SURGERY

## 2019-07-11 PROCEDURE — 7100000010 HC PHASE II RECOVERY - FIRST 15 MIN: Performed by: ORTHOPAEDIC SURGERY

## 2019-07-11 PROCEDURE — 2500000003 HC RX 250 WO HCPCS: Performed by: ORTHOPAEDIC SURGERY

## 2019-07-11 PROCEDURE — 3700000000 HC ANESTHESIA ATTENDED CARE: Performed by: ORTHOPAEDIC SURGERY

## 2019-07-11 RX ORDER — SODIUM CHLORIDE 9 MG/ML
INJECTION, SOLUTION INTRAVENOUS CONTINUOUS
Status: DISCONTINUED | OUTPATIENT
Start: 2019-07-11 | End: 2019-07-11

## 2019-07-11 RX ORDER — FENTANYL CITRATE 50 UG/ML
50 INJECTION, SOLUTION INTRAMUSCULAR; INTRAVENOUS EVERY 5 MIN PRN
Status: DISCONTINUED | OUTPATIENT
Start: 2019-07-11 | End: 2019-07-11 | Stop reason: HOSPADM

## 2019-07-11 RX ORDER — ACETAMINOPHEN 10 MG/ML
INJECTION, SOLUTION INTRAVENOUS
Status: COMPLETED
Start: 2019-07-11 | End: 2019-07-11

## 2019-07-11 RX ORDER — OXYCODONE HYDROCHLORIDE AND ACETAMINOPHEN 5; 325 MG/1; MG/1
1 TABLET ORAL PRN
Status: COMPLETED | OUTPATIENT
Start: 2019-07-11 | End: 2019-07-11

## 2019-07-11 RX ORDER — MORPHINE SULFATE 2 MG/ML
1 INJECTION, SOLUTION INTRAMUSCULAR; INTRAVENOUS EVERY 5 MIN PRN
Status: DISCONTINUED | OUTPATIENT
Start: 2019-07-11 | End: 2019-07-11 | Stop reason: HOSPADM

## 2019-07-11 RX ORDER — SODIUM CHLORIDE, SODIUM LACTATE, POTASSIUM CHLORIDE, CALCIUM CHLORIDE 600; 310; 30; 20 MG/100ML; MG/100ML; MG/100ML; MG/100ML
INJECTION, SOLUTION INTRAVENOUS CONTINUOUS
Status: DISCONTINUED | OUTPATIENT
Start: 2019-07-11 | End: 2019-07-11 | Stop reason: HOSPADM

## 2019-07-11 RX ORDER — SODIUM CHLORIDE, SODIUM LACTATE, POTASSIUM CHLORIDE, CALCIUM CHLORIDE 600; 310; 30; 20 MG/100ML; MG/100ML; MG/100ML; MG/100ML
INJECTION, SOLUTION INTRAVENOUS CONTINUOUS PRN
Status: DISCONTINUED | OUTPATIENT
Start: 2019-07-11 | End: 2019-07-11 | Stop reason: SDUPTHER

## 2019-07-11 RX ORDER — SODIUM CHLORIDE 0.9 % (FLUSH) 0.9 %
10 SYRINGE (ML) INJECTION PRN
Status: DISCONTINUED | OUTPATIENT
Start: 2019-07-11 | End: 2019-07-11 | Stop reason: HOSPADM

## 2019-07-11 RX ORDER — FENTANYL CITRATE 50 UG/ML
25 INJECTION, SOLUTION INTRAMUSCULAR; INTRAVENOUS EVERY 5 MIN PRN
Status: DISCONTINUED | OUTPATIENT
Start: 2019-07-11 | End: 2019-07-11 | Stop reason: HOSPADM

## 2019-07-11 RX ORDER — ONDANSETRON 2 MG/ML
4 INJECTION INTRAMUSCULAR; INTRAVENOUS
Status: DISCONTINUED | OUTPATIENT
Start: 2019-07-11 | End: 2019-07-11 | Stop reason: HOSPADM

## 2019-07-11 RX ORDER — PROPOFOL 10 MG/ML
INJECTION, EMULSION INTRAVENOUS PRN
Status: DISCONTINUED | OUTPATIENT
Start: 2019-07-11 | End: 2019-07-11 | Stop reason: SDUPTHER

## 2019-07-11 RX ORDER — FENTANYL CITRATE 50 UG/ML
INJECTION, SOLUTION INTRAMUSCULAR; INTRAVENOUS PRN
Status: DISCONTINUED | OUTPATIENT
Start: 2019-07-11 | End: 2019-07-11 | Stop reason: SDUPTHER

## 2019-07-11 RX ORDER — BUPIVACAINE HYDROCHLORIDE 2.5 MG/ML
INJECTION, SOLUTION INFILTRATION; PERINEURAL PRN
Status: DISCONTINUED | OUTPATIENT
Start: 2019-07-11 | End: 2019-07-11 | Stop reason: ALTCHOICE

## 2019-07-11 RX ORDER — METHYLPREDNISOLONE ACETATE 80 MG/ML
INJECTION, SUSPENSION INTRA-ARTICULAR; INTRALESIONAL; INTRAMUSCULAR; SOFT TISSUE PRN
Status: DISCONTINUED | OUTPATIENT
Start: 2019-07-11 | End: 2019-07-11 | Stop reason: ALTCHOICE

## 2019-07-11 RX ORDER — MORPHINE SULFATE 2 MG/ML
2 INJECTION, SOLUTION INTRAMUSCULAR; INTRAVENOUS EVERY 5 MIN PRN
Status: DISCONTINUED | OUTPATIENT
Start: 2019-07-11 | End: 2019-07-11 | Stop reason: HOSPADM

## 2019-07-11 RX ORDER — EPHEDRINE SULFATE 50 MG/ML
INJECTION INTRAVENOUS PRN
Status: DISCONTINUED | OUTPATIENT
Start: 2019-07-11 | End: 2019-07-11 | Stop reason: SDUPTHER

## 2019-07-11 RX ORDER — SODIUM CHLORIDE 0.9 % (FLUSH) 0.9 %
10 SYRINGE (ML) INJECTION EVERY 12 HOURS SCHEDULED
Status: DISCONTINUED | OUTPATIENT
Start: 2019-07-11 | End: 2019-07-11 | Stop reason: HOSPADM

## 2019-07-11 RX ORDER — MEPERIDINE HYDROCHLORIDE 50 MG/ML
12.5 INJECTION INTRAMUSCULAR; INTRAVENOUS; SUBCUTANEOUS EVERY 5 MIN PRN
Status: DISCONTINUED | OUTPATIENT
Start: 2019-07-11 | End: 2019-07-11 | Stop reason: HOSPADM

## 2019-07-11 RX ORDER — SODIUM CHLORIDE, SODIUM LACTATE, POTASSIUM CHLORIDE, CALCIUM CHLORIDE 600; 310; 30; 20 MG/100ML; MG/100ML; MG/100ML; MG/100ML
INJECTION, SOLUTION INTRAVENOUS CONTINUOUS
Status: CANCELLED | OUTPATIENT
Start: 2019-07-11

## 2019-07-11 RX ORDER — OXYCODONE HYDROCHLORIDE AND ACETAMINOPHEN 5; 325 MG/1; MG/1
2 TABLET ORAL PRN
Status: COMPLETED | OUTPATIENT
Start: 2019-07-11 | End: 2019-07-11

## 2019-07-11 RX ORDER — ONDANSETRON 2 MG/ML
INJECTION INTRAMUSCULAR; INTRAVENOUS PRN
Status: DISCONTINUED | OUTPATIENT
Start: 2019-07-11 | End: 2019-07-11 | Stop reason: SDUPTHER

## 2019-07-11 RX ORDER — HYDROCODONE BITARTRATE AND ACETAMINOPHEN 5; 325 MG/1; MG/1
1 TABLET ORAL EVERY 6 HOURS PRN
Qty: 28 TABLET | Refills: 0 | Status: SHIPPED | OUTPATIENT
Start: 2019-07-11 | End: 2019-07-18

## 2019-07-11 RX ORDER — ACETAMINOPHEN 10 MG/ML
1000 INJECTION, SOLUTION INTRAVENOUS ONCE
Status: DISCONTINUED | OUTPATIENT
Start: 2019-07-11 | End: 2019-07-11 | Stop reason: HOSPADM

## 2019-07-11 RX ORDER — SODIUM CHLORIDE, SODIUM LACTATE, POTASSIUM CHLORIDE, AND CALCIUM CHLORIDE .6; .31; .03; .02 G/100ML; G/100ML; G/100ML; G/100ML
IRRIGANT IRRIGATION PRN
Status: DISCONTINUED | OUTPATIENT
Start: 2019-07-11 | End: 2019-07-11 | Stop reason: ALTCHOICE

## 2019-07-11 RX ADMIN — EPHEDRINE SULFATE 20 MG: 50 INJECTION INTRAVENOUS at 07:23

## 2019-07-11 RX ADMIN — SODIUM CHLORIDE, POTASSIUM CHLORIDE, SODIUM LACTATE AND CALCIUM CHLORIDE: 600; 310; 30; 20 INJECTION, SOLUTION INTRAVENOUS at 07:04

## 2019-07-11 RX ADMIN — ACETAMINOPHEN 1000 MG: 10 INJECTION, SOLUTION INTRAVENOUS at 06:44

## 2019-07-11 RX ADMIN — SODIUM CHLORIDE, POTASSIUM CHLORIDE, SODIUM LACTATE AND CALCIUM CHLORIDE: 600; 310; 30; 20 INJECTION, SOLUTION INTRAVENOUS at 07:11

## 2019-07-11 RX ADMIN — FAMOTIDINE 20 MG: 10 INJECTION, SOLUTION INTRAVENOUS at 06:45

## 2019-07-11 RX ADMIN — ONDANSETRON 4 MG: 2 INJECTION INTRAMUSCULAR; INTRAVENOUS at 07:30

## 2019-07-11 RX ADMIN — OXYCODONE HYDROCHLORIDE AND ACETAMINOPHEN 2 TABLET: 5; 325 TABLET ORAL at 08:24

## 2019-07-11 RX ADMIN — PROPOFOL 200 MG: 10 INJECTION, EMULSION INTRAVENOUS at 07:11

## 2019-07-11 RX ADMIN — Medication 2 G: at 07:15

## 2019-07-11 RX ADMIN — FENTANYL CITRATE 100 MCG: 50 INJECTION INTRAMUSCULAR; INTRAVENOUS at 07:11

## 2019-07-11 ASSESSMENT — PULMONARY FUNCTION TESTS
PIF_VALUE: 10
PIF_VALUE: 5
PIF_VALUE: 12
PIF_VALUE: 6
PIF_VALUE: 7
PIF_VALUE: 13
PIF_VALUE: 2
PIF_VALUE: 5
PIF_VALUE: 14
PIF_VALUE: 5
PIF_VALUE: 6
PIF_VALUE: 11
PIF_VALUE: 4
PIF_VALUE: 3
PIF_VALUE: 2
PIF_VALUE: 3
PIF_VALUE: 18
PIF_VALUE: 14
PIF_VALUE: 6
PIF_VALUE: 6
PIF_VALUE: 4
PIF_VALUE: 3
PIF_VALUE: 2
PIF_VALUE: 2
PIF_VALUE: 6
PIF_VALUE: 2
PIF_VALUE: 6
PIF_VALUE: 5
PIF_VALUE: 10
PIF_VALUE: 3
PIF_VALUE: 2
PIF_VALUE: 6
PIF_VALUE: 3
PIF_VALUE: 7
PIF_VALUE: 16
PIF_VALUE: 13
PIF_VALUE: 5

## 2019-07-11 ASSESSMENT — PAIN DESCRIPTION - DESCRIPTORS
DESCRIPTORS: ACHING;CONSTANT;SHARP
DESCRIPTORS: ACHING;CONSTANT;SHARP
DESCRIPTORS: ACHING;CONSTANT
DESCRIPTORS: ACHING;CONSTANT

## 2019-07-11 ASSESSMENT — PAIN DESCRIPTION - LOCATION
LOCATION: KNEE

## 2019-07-11 ASSESSMENT — PAIN SCALES - GENERAL
PAINLEVEL_OUTOF10: 7
PAINLEVEL_OUTOF10: 6
PAINLEVEL_OUTOF10: 6
PAINLEVEL_OUTOF10: 4
PAINLEVEL_OUTOF10: 7

## 2019-07-11 ASSESSMENT — PAIN - FUNCTIONAL ASSESSMENT: PAIN_FUNCTIONAL_ASSESSMENT: 0-10

## 2019-07-11 ASSESSMENT — PAIN DESCRIPTION - ORIENTATION
ORIENTATION: RIGHT

## 2019-07-11 NOTE — OP NOTE
elevated, prepped and draped in the normal, sterile fashion. Diagnostic Arthroscopy  A standard anterolateral portal was established. The trocar and cannula were inserted. An outflow cannula was also placed in the suprapatellar pouch. The arthroscope trocar was removed and the arthroscope was inserted. The anteromedial portal was established under direct vision after localizing the joint with a spinal needle. A probe was inserted and all relevant structures probed. Systematic arthroscopy was performed and the findings are summarized below:    1. Patellofemoral Compartment-   · The articular cartilage was significantly involved with chondromalacia involving the entire patella and the entire trochlear groove with primarily grade III chondromalacia throughout there were no areas of complete grade 4 articular cartilage loss. · There were multiple small cartilaginous loose bodies in the suprapatellar pouch  2. Medial Compartment-   · A tear of the medial meniscus was identified. This tear was complex and involve the entire posterior horn of the medial meniscus although the root was intact. · There were significant multiple grade 2 and 3 chondral changes. 3. Intercondylar Notch-   · The ACL and PCL were intact  4. Lateral Compartment-   · The lateral meniscus was intact. · There were several areas of grade 2 chondral changes throughout the lateral compartment probably a little worse on the tibial side and the femoral side. .  5. The suprapatellar pouch synovium and the synovium of the medial and lateral gutters were examined for evidence of hypertrophic synovitis and also any evidence of additional inflammatory pathologic changes as is PVNS. Although as described there was significant inflammatory changes there was no evidence of PVNS.   6. In addition, the anterior region of the knee below the patella such as Hoffa's fat pad were examined for pathology such as thickening, scarring, or overgrowth with

## 2019-07-15 ENCOUNTER — HOSPITAL ENCOUNTER (OUTPATIENT)
Dept: PHYSICAL THERAPY | Age: 59
Setting detail: THERAPIES SERIES
Discharge: HOME | End: 2019-07-15
Payer: COMMERCIAL

## 2019-07-15 PROCEDURE — 97110 THERAPEUTIC EXERCISES: CPT | Performed by: PHYSICAL THERAPIST

## 2019-07-15 PROCEDURE — 97161 PT EVAL LOW COMPLEX 20 MIN: CPT | Performed by: PHYSICAL THERAPIST

## 2019-07-15 PROCEDURE — G0283 ELEC STIM OTHER THAN WOUND: HCPCS | Performed by: PHYSICAL THERAPIST

## 2019-07-15 NOTE — PLAN OF CARE
care  []3 personal factors and/or comorbidities that impact the plan of care  [x] An examination of body systems using standardized tests and measures addressing any of the following: body structures and functions (impairments), activity limitations, and/or participation restrictions;:  [x] a total of 1-2 or more elements   [] a total of 3 or more elements   [] a total of 4 or more elements   [x] A clinical presentation with:  [x] stable and/or uncomplicated characteristics   [] evolving clinical presentation with changing characteristics  [] unstable and unpredictable characteristics;   [x] Clinical decision making of [x] low, [] moderate, [] high complexity using standardized patient assessment instrument and/or measurable assessment of functional outcome. [x] EVAL (LOW) 06379 (typically 20 minutes face-to-face)  [] EVAL (MOD) 27992 (typically 30 minutes face-to-face)  [] EVAL (HIGH) 25628 (typically 45 minutes face-to-face)  [] RE-EVAL       PLAN:   Frequency/Duration:  2 days per week for 8 Weeks:  Interventions:  [x]  Therapeutic exercise including: strength training, ROM, for Lower extremity and core   [x]  NMR activation and proprioception for LE, Glutes and Core   [x]  Manual therapy as indicated for LE, Hip and spine to include: Dry Needling/IASTM, STM, PROM, Gr I-IV mobilizations, manipulation. [x] Modalities as needed that may include: thermal agents, E-stim, Biofeedback, US, iontophoresis as indicated  [x] Patient education on joint protection, postural re-education, activity modification, progression of HEP. HEP instruction: HEP instruction was provided and handouts given to include:  (see scanned forms)    GOALS:  Patient stated goal: to return to work in ana capacity    Therapist goals for Patient:   Short Term Goals: To be achieved in: 2 weeks  1. Independent in HEP and progression per patient tolerance, in order to prevent re-injury.    2. Patient will have a decrease in pain to facilitate

## 2019-07-15 NOTE — FLOWSHEET NOTE
implemented, too soon to assess goals progression  [] Other:     ASSESSMENT:  See eval for current assessment.     Treatment/Activity Tolerance:  [x] Patient tolerated treatment well [] Patient limited by fatique  [] Patient limited by pain  [] Patient limited by other medical complications  [] Other:     Prognosis: [x] Good [] Fair  [] Poor    Patient Requires Follow-up: [x] Yes  [] No    PLAN: See eval for full POC  [] Continue per plan of care [] Alter current plan (see comments)  [x] Plan of care initiated [] Hold pending MD visit [] Discharge    Electronically signed by: Kelly Valadez, [id number]

## 2019-07-17 ENCOUNTER — HOSPITAL ENCOUNTER (OUTPATIENT)
Dept: PHYSICAL THERAPY | Age: 59
Setting detail: THERAPIES SERIES
Discharge: HOME | End: 2019-07-17
Payer: COMMERCIAL

## 2019-07-17 PROCEDURE — 97112 NEUROMUSCULAR REEDUCATION: CPT | Performed by: PHYSICAL THERAPIST

## 2019-07-17 PROCEDURE — 97110 THERAPEUTIC EXERCISES: CPT | Performed by: PHYSICAL THERAPIST

## 2019-07-17 PROCEDURE — 97140 MANUAL THERAPY 1/> REGIONS: CPT | Performed by: PHYSICAL THERAPIST

## 2019-07-22 ENCOUNTER — OFFICE VISIT (OUTPATIENT)
Dept: ORTHOPEDIC SURGERY | Age: 59
End: 2019-07-22

## 2019-07-22 ENCOUNTER — HOSPITAL ENCOUNTER (OUTPATIENT)
Dept: PHYSICAL THERAPY | Age: 59
Setting detail: THERAPIES SERIES
Discharge: HOME | End: 2019-07-22
Payer: COMMERCIAL

## 2019-07-22 VITALS — HEIGHT: 72 IN | WEIGHT: 236.99 LBS | BODY MASS INDEX: 32.1 KG/M2

## 2019-07-22 DIAGNOSIS — S83.231D COMPLEX TEAR OF MEDIAL MENISCUS OF RIGHT KNEE AS CURRENT INJURY, SUBSEQUENT ENCOUNTER: Primary | ICD-10-CM

## 2019-07-22 PROCEDURE — 97112 NEUROMUSCULAR REEDUCATION: CPT | Performed by: PHYSICAL THERAPIST

## 2019-07-22 PROCEDURE — 97110 THERAPEUTIC EXERCISES: CPT | Performed by: PHYSICAL THERAPIST

## 2019-07-22 PROCEDURE — 99024 POSTOP FOLLOW-UP VISIT: CPT | Performed by: ORTHOPAEDIC SURGERY

## 2019-07-22 PROCEDURE — 97140 MANUAL THERAPY 1/> REGIONS: CPT | Performed by: PHYSICAL THERAPIST

## 2019-07-22 NOTE — FLOWSHEET NOTE
3. Patient will demonstrate an increase in Strength to good proximal hip/LE strength and control, 4+/5 in LE to allow for proper functional mobility as indicated by patients Functional Deficits. 4. Patient will return to daily functional activities without increased symptoms or restriction. 5. Pt will be able to navigate up and down stairs without AD (10 step flight) with proper pattern. (patient specific functional goal)             Progression Towards Functional goals:  [x] Patient is progressing as expected towards functional goals listed. [] Progression is slowed due to complexities listed. [] Progression has been slowed due to co-morbidities. [x] Plan just implemented, too soon to assess goals progression  [] Other:     ASSESSMENT:  Pt educated on performing HEP everyday, even if has increased swelling. Pt's AROM has improved from 90 degrees to 114 degrees. Pt is lacking quad strength and TKE. Pt has slightly increased swelling in knee, that can be contributed to increased activity on Saturday. Pt educated on modifying activity and continuing with ice and elevation. Treatment/Activity Tolerance:  [x] Patient tolerated treatment well [] Patient limited by fatique  [] Patient limited by pain  [] Patient limited by other medical complications  [] Other:     Prognosis: [x] Good [] Fair  [] Poor    Patient Requires Follow-up: [x] Yes  [] No    PLAN: continue quad strength and knee ROM, add TKE TE NV  [x] Continue per plan of care [] Alter current plan (see comments)  [] Plan of care initiated [] Hold pending MD visit [] Discharge    Electronically signed by: Justin Hernandez, [id number]  Medina Coombs, [address] [place] [address]   Therapist was present, directed the patient's care, made skilled judgement, and was responsible for assessment and treatment of the patient.

## 2019-07-25 ENCOUNTER — HOSPITAL ENCOUNTER (OUTPATIENT)
Dept: PHYSICAL THERAPY | Age: 59
Setting detail: THERAPIES SERIES
Discharge: HOME | End: 2019-07-25
Payer: COMMERCIAL

## 2019-07-25 PROCEDURE — 97110 THERAPEUTIC EXERCISES: CPT | Performed by: PHYSICAL THERAPIST

## 2019-07-25 PROCEDURE — 97112 NEUROMUSCULAR REEDUCATION: CPT | Performed by: PHYSICAL THERAPIST

## 2019-07-25 PROCEDURE — 97016 VASOPNEUMATIC DEVICE THERAPY: CPT | Performed by: PHYSICAL THERAPIST

## 2019-07-25 PROCEDURE — G0283 ELEC STIM OTHER THAN WOUND: HCPCS | Performed by: PHYSICAL THERAPIST

## 2019-07-25 PROCEDURE — 97140 MANUAL THERAPY 1/> REGIONS: CPT | Performed by: PHYSICAL THERAPIST

## 2019-07-25 NOTE — FLOWSHEET NOTE
for the LEFS to assist with reaching prior level of function. 2. Patient will demonstrate increased AROM to 0-125 to allow for proper joint functioning as indicated by patients Functional Deficits. 3. Patient will demonstrate an increase in Strength to good proximal hip/LE strength and control, 4+/5 in LE to allow for proper functional mobility as indicated by patients Functional Deficits. 4. Patient will return to daily functional activities without increased symptoms or restriction. 5. Pt will be able to navigate up and down stairs without AD (10 step flight) with proper pattern. (patient specific functional goal)             Progression Towards Functional goals:  [x] Patient is progressing as expected towards functional goals listed. [] Progression is slowed due to complexities listed. [] Progression has been slowed due to co-morbidities. [x] Plan just implemented, too soon to assess goals progression  [] Other:     ASSESSMENT:  Pt needs to cont to ice and elevate. His edema indicates ES and compression regardless of his wanting to ice at home since his swelling has not decreased since the onset. Poor quad control and requires consistent cuing to perform quad exercises correctly.     Treatment/Activity Tolerance:  [x] Patient tolerated treatment well [] Patient limited by fatique  [] Patient limited by pain  [] Patient limited by other medical complications  [] Other:     Prognosis: [x] Good [] Fair  [] Poor    Patient Requires Follow-up: [x] Yes  [] No    PLAN:   [x] Continue per plan of care [] Alter current plan (see comments)  [] Plan of care initiated [] Hold pending MD visit [] Discharge    Electronically signed by: Yamilet Hale [id number]

## 2019-07-29 ENCOUNTER — HOSPITAL ENCOUNTER (OUTPATIENT)
Dept: PHYSICAL THERAPY | Age: 59
Setting detail: THERAPIES SERIES
Discharge: HOME | End: 2019-07-29
Payer: COMMERCIAL

## 2019-07-29 PROCEDURE — 97140 MANUAL THERAPY 1/> REGIONS: CPT | Performed by: PHYSICAL THERAPIST

## 2019-07-29 PROCEDURE — 97110 THERAPEUTIC EXERCISES: CPT | Performed by: PHYSICAL THERAPIST

## 2019-08-01 ENCOUNTER — HOSPITAL ENCOUNTER (OUTPATIENT)
Dept: PHYSICAL THERAPY | Age: 59
Setting detail: THERAPIES SERIES
Discharge: HOME | End: 2019-08-01
Payer: COMMERCIAL

## 2019-08-01 PROCEDURE — 97110 THERAPEUTIC EXERCISES: CPT | Performed by: PHYSICAL THERAPIST

## 2019-08-01 PROCEDURE — G0283 ELEC STIM OTHER THAN WOUND: HCPCS | Performed by: PHYSICAL THERAPIST

## 2019-08-01 PROCEDURE — 97140 MANUAL THERAPY 1/> REGIONS: CPT | Performed by: PHYSICAL THERAPIST

## 2019-08-05 ENCOUNTER — HOSPITAL ENCOUNTER (OUTPATIENT)
Dept: PHYSICAL THERAPY | Age: 59
Setting detail: THERAPIES SERIES
Discharge: HOME | End: 2019-08-05
Payer: COMMERCIAL

## 2019-08-05 PROCEDURE — 97140 MANUAL THERAPY 1/> REGIONS: CPT | Performed by: PHYSICAL THERAPIST

## 2019-08-05 PROCEDURE — 97110 THERAPEUTIC EXERCISES: CPT | Performed by: PHYSICAL THERAPIST

## 2019-08-08 ENCOUNTER — HOSPITAL ENCOUNTER (OUTPATIENT)
Dept: PHYSICAL THERAPY | Age: 59
Setting detail: THERAPIES SERIES
Discharge: HOME | End: 2019-08-08
Payer: COMMERCIAL

## 2019-08-08 PROCEDURE — 97112 NEUROMUSCULAR REEDUCATION: CPT | Performed by: PHYSICAL THERAPIST

## 2019-08-08 PROCEDURE — 97140 MANUAL THERAPY 1/> REGIONS: CPT | Performed by: PHYSICAL THERAPIST

## 2019-08-08 PROCEDURE — 97110 THERAPEUTIC EXERCISES: CPT | Performed by: PHYSICAL THERAPIST

## 2019-08-08 PROCEDURE — G0283 ELEC STIM OTHER THAN WOUND: HCPCS | Performed by: PHYSICAL THERAPIST

## 2019-08-08 NOTE — FLOWSHEET NOTE
RESTRICTIONS/PRECAUTIONS: none    Exercises/Interventions: unless otherwise noted interventions are performed on involved limb/side    Therapeutic Ex (20') Sets/Reps/Sec Notes   HEP   HEP (still not doing correctly)   20x10\" HEP   SLR FLX with tape After [id number] 5\" HEP   +HEp 7/25   +HEP 7/25   HEP, 114 degrees AROM flexion   +HEP 8/1/19   SB rolls for FLX 20x5\"    SB mini bridge with full KE 20x5\"    +HEP 8/1/19               +HEP 7/25   Prone TKE with tape 2x10x5\"    Recumbent bike for ROM and joint mobility 5' full ROM no resistance    gastroc incline stretch RLE 3x30\"         45#   [place] 2 x 10 Cues to sit back   Standing hip abd R/L 2 x10 ea Cues for neutral hips, upright posture           Manual Intervention (13')     STM swelling, quad 3'    Gentle pat mobs 3'    Extension mobs 2'            Patellar cupping K tape 1 piece 1/2\" lateral border, 1 piece 1/2\" medial,  50% tension distal to proximal,  3'    NMR re-education (9')     SLR flex 5'  SAQ 6\" bolster4'   10/10  1.5 ramp  biphasic                             Therapeutic Exercise and NMR EXR  [x] (07519) Provided verbal/tactile cueing for activities related to strengthening, flexibility, endurance, ROM for improvements in extrenmity and core control with self care, mobility, lifting, ambulation.  [] (53871) Provided verbal/tactile cueing for activities related to improving balance, coordination, kinesthetic sense, posture, motor skill, proprioception  to assist with exremity and core control in self care, mobility, lifting, ambulation and eccentric single leg and arm  control.      NMR and Therapeutic Activities:    [x] (43282 or 89235) Provided verbal/tactile cueing for activities related to improving balance, coordination, kinesthetic sense, posture, motor skill, proprioception and motor activation to allow for proper function of core and extremities with self care and ADLs  [] (08506) Gait Re-education- Provided

## 2019-08-12 ENCOUNTER — HOSPITAL ENCOUNTER (OUTPATIENT)
Dept: PHYSICAL THERAPY | Age: 59
Setting detail: THERAPIES SERIES
Discharge: HOME | End: 2019-08-12
Payer: COMMERCIAL

## 2019-08-12 ENCOUNTER — OFFICE VISIT (OUTPATIENT)
Dept: ORTHOPEDIC SURGERY | Age: 59
End: 2019-08-12

## 2019-08-12 VITALS — BODY MASS INDEX: 31.97 KG/M2 | WEIGHT: 236 LBS | HEIGHT: 72 IN

## 2019-08-12 DIAGNOSIS — M25.461 EFFUSION, RIGHT KNEE: ICD-10-CM

## 2019-08-12 DIAGNOSIS — S83.231D COMPLEX TEAR OF MEDIAL MENISCUS OF RIGHT KNEE AS CURRENT INJURY, SUBSEQUENT ENCOUNTER: Primary | ICD-10-CM

## 2019-08-12 PROCEDURE — 99024 POSTOP FOLLOW-UP VISIT: CPT | Performed by: ORTHOPAEDIC SURGERY

## 2019-08-12 PROCEDURE — 97110 THERAPEUTIC EXERCISES: CPT | Performed by: PHYSICAL THERAPIST

## 2019-08-12 PROCEDURE — 97112 NEUROMUSCULAR REEDUCATION: CPT | Performed by: PHYSICAL THERAPIST

## 2019-08-12 NOTE — FLOWSHEET NOTE
[facility], [address] [place]  Phone: [phone]  Fax [phone]        Physical Therapy Daily Treatment Note  Date:  2019    Patient Name:  Laura Alan    :  1960  MRN: [id number]       Date of Onset of injury/condition: sx 19    Medical/Treatment Diagnosis Information:  · Diagnosis: S83.231D (ICD-10-CM) - Complex tear of medial meniscus of right knee as current injury, subsequent encounter  · Treatment Diagnosis: s/p R knee PMM 19; R knee pain M25.561 ; R knee stiffness V71.625  Insurance/Certification information:  PT Insurance Information:  Shea [employer] [facility]  - $[id number]%-24PT/OT- EXP 19    Physician Information:  Referring Practitioner: Rosa Sosa MD    Plan of care signed (Y/N): Y       Date of Patient follow up with Physician: 19    Date of onset of PT:  7/15/19      POC Due:   OP NOTE Due: done 19  10th VISIT NOTE  Completed:        Latex Allergy:  [x]NO      []YES  Preferred Language for Healthcare:   [x]English       []other:     Visit # Insurance Allowable Requires auth    EXP 19  Pt was asked regarding this and he said he hs same insurance still not      [x]no        []yes:     Pain level:   0-1/10     SUBJECTIVE: Pt has to leave in 30 mins for MD appt. C/O more stiffness than anything, no real pain. OBJECTIVE:   Observation:  Pt continues to have edema superior knee in distal quad that is also medial and lateral to patella this visit, quad tone continues to improve. Palpation: Edema is more mobile with massage and overall decreasing.       OBJECTIVE  Test used Initial score  7/15/19 Current Score  19   Pain Summary 0-1/10 4/10 1/10   Functional questionnaire LEFS 69%                ROM KE -5 -4    KF 90 125         Swelling (cm) IP  MP  SP 39  45  47 42  45  46.5               Strength Quad tone Trace to poor Poor    KE  4+/5    KF including up and down stairs     Home Exercise Program:    [x] (27506) Reviewed/Progressed HEP activities related to strengthening, flexibility, endurance, ROM of core and extremity for functional self-care, mobility, lifting and ambulation.  [] (83790)Reviewed/Progressed HEP activities related to improving balance, coordination, kinesthetic sense, posture, motor skill, proprioception of core and extremity for functional self-care, mobility, lifting and ambulation. Manual Treatments:  PROM / STM / Oscillations-Mobs:  G-I, II, III, IV (PA's, Inf., Post.)  [x] (18568) Provided manual therapy to mobilize upper and/or LE joints, spine, soft tissue/joints for the purpose of modulating pain, promoting relaxation,  increasing ROM, reducing/eliminating soft tissue swelling/inflammation/restriction, improving soft tissue extensibility and allowing for proper ROM for normal function with self care, mobility, lifting and ambulation. Modalities:  (time constraints)  Charges:  Timed Code Treatment Minutes: 35'   Total Treatment Minutes: 28'     [] EVAL (LOW) 32926 (typically 20 minutes face-to-face)  [] EVAL (MOD) 19455 (typically 30 minutes face-to-face)  [] EVAL (HIGH) 30830 (typically 45 minutes face-to-face)  [] RE-EVAL     [x] RAMOS(67890) x 1  [] IONTO  [x] NMR (60083) x  1  [] VASO  [] Manual (28674) x      [] Other:  [] TA x      [] Mech Traction (50383)  [] ES(attended) (28223)      [] ES (un) (89029):     GOALS:   Short Term Goals: To be achieved in: 2 weeks  1. Independent in HEP and progression per patient tolerance, in order to prevent re-injury. MET  2. Patient will have a decrease in pain to facilitate improvement in movement, function, and ADLs as indicated by Functional Deficits. MET     Long Term Goals: To be achieved in: 8 weeks (9/15/19)  1. Disability index score of 35% or less for the LEFS to assist with reaching prior level of function.    2. Patient will demonstrate increased AROM to 0-125 to allow

## 2019-08-12 NOTE — PROGRESS NOTES
assist with pain control and to reduce inflammatory changes. These medications may be over-the-counter or prescribed. We discussed taking the NSAID properly and the precautions. The patient understands that this medication may potentially interfere with other medications. Patient was also instructed to immediately discontinue the medication is there is any possible complication. Poornima Garner PA-C, scribing for and in the presence of Dr. Rosie Beth   8/12/2019 9:02 AM    I have evaluated the patient myself and completed the examination of the patient on date of visit. I have discussed the case and reviewed all pertinent data with the Poornima ARANDA, and agree with the plan noted above. Dr. Rosie Beth MD        This dictation was performed with a verbal recognition program [facility]) and it was checked for errors. It is possible that there are still dictated errors within this office note. If so, please bring any errors to my attention for an addendum. All efforts were made to ensure that this office note is accurate.

## 2019-08-15 ENCOUNTER — HOSPITAL ENCOUNTER (OUTPATIENT)
Dept: PHYSICAL THERAPY | Age: 59
Setting detail: THERAPIES SERIES
Discharge: HOME | End: 2019-08-15
Payer: COMMERCIAL

## 2019-08-15 PROCEDURE — 97110 THERAPEUTIC EXERCISES: CPT | Performed by: PHYSICAL THERAPIST

## 2019-08-15 PROCEDURE — 97140 MANUAL THERAPY 1/> REGIONS: CPT | Performed by: PHYSICAL THERAPIST

## 2019-08-15 PROCEDURE — 97016 VASOPNEUMATIC DEVICE THERAPY: CPT | Performed by: PHYSICAL THERAPIST

## 2019-08-15 PROCEDURE — 97112 NEUROMUSCULAR REEDUCATION: CPT | Performed by: PHYSICAL THERAPIST

## 2019-08-15 PROCEDURE — G0283 ELEC STIM OTHER THAN WOUND: HCPCS | Performed by: PHYSICAL THERAPIST

## 2019-08-15 NOTE — FLOWSHEET NOTE
Poor    KE  4+/5    KF  5/5    HF  4+/5    HAB  4+/5             RESTRICTIONS/PRECAUTIONS: none    Exercises/Interventions: unless otherwise noted interventions are performed on involved limb/side    Therapeutic Ex (20') Sets/Reps/Sec Notes   Seated HS stretch 3x30\" HEP   Prone HSC 20x3\" HEP   Prone TKE 20x3\" HEP   SLR HAB(1#) 3x10x3\" HEP   SLR FLX  NMES HEP   SAQ NMES HEP   Bridges with ADD at knees 20x5\" HEP   Clams with TB GTB 2x10x3\" HEP   Incline stretch 5x15\"    Standing calf stretch 5x15\" HEP                                                                        AT See AT note    Manual Intervention (8')     STM quad and ITB/edema/patellar mobs 4'    Prone quad stretch 4'                  NMR re-education (12')     Biphasic 10\"/10\" 2.0 ramp Seated QS 3'  Seated SAQ 3'  SLR FLX 3'  BHR with ball between feet 3'                             Therapeutic Exercise and NMR EXR  [x] (56874) Provided verbal/tactile cueing for activities related to strengthening, flexibility, endurance, ROM for improvements in extrenmity and core control with self care, mobility, lifting, ambulation.  [] (02137) Provided verbal/tactile cueing for activities related to improving balance, coordination, kinesthetic sense, posture, motor skill, proprioception  to assist with exremity and core control in self care, mobility, lifting, ambulation and eccentric single leg and arm  control.      NMR and Therapeutic Activities:    [x] (08709 or 01506) Provided verbal/tactile cueing for activities related to improving balance, coordination, kinesthetic sense, posture, motor skill, proprioception and motor activation to allow for proper function of core and extremities with self care and ADLs  [] (36707) Gait Re-education- Provided training and instruction to the patient for proper LE, core and proximal hip recruitment and positioning and eccentric body weight control with ambulation re-education including up and down stairs     Home Exercise assist with reaching prior level of function. 2. Patient will demonstrate increased AROM to 0-125 to allow for proper joint functioning as indicated by patients Functional Deficits. MET  3. Patient will demonstrate an increase in Strength to good proximal hip/LE strength and control, 4+/5 in LE to allow for proper functional mobility as indicated by patients Functional Deficits. MET  4. Patient will return to daily functional activities without increased symptoms or restriction. 5. Pt will be able to navigate up and down stairs without AD (10 step flight) with proper pattern. (patient specific functional goal)             Progression Towards Functional goals:  [x] Patient is progressing as expected towards functional goals listed. [] Progression is slowed due to complexities listed. [] Progression has been slowed due to co-morbidities. [] Plan just implemented, too soon to assess goals progression  [] Other:     ASSESSMENT: It was determined that patient has not time constraints that limit his treatment time any appt, he simply wants to be done so says he needs to go. It was stressed he needs to complete the treatment as well as ice/vaso and ESU as long as he is overly swollen. Treatment/Activity Tolerance:  [x] Patient tolerated treatment well [] Patient limited by fatique  [] Patient limited by pain  [] Patient limited by other medical complications  [x] Other: see assessment    Prognosis: [x] Good [] Fair  [] Poor    Patient Requires Follow-up: [x] Yes  [] No    PLAN:Cont to focus on quad and function, decrease edema.   [x] Continue per plan of care [] Alter current plan (see comments)  [] Plan of care initiated [] Hold pending MD visit [] Discharge    Electronically signed by: Frank Neville, [facility] , [id number]

## 2019-08-15 NOTE — PLAN OF CARE
[facility], [address] [facility]  [address] [place] [address]  Phone: [phone]  Fax [phone]     Physical Therapy Re-Certification Plan of Care    Dear Dr Kermit Tiwari  ,    We had the pleasure of treating the following patient for physical therapy services at [address] [place] [address]. A summary of our findings can be found in the updated assessment below. This includes our plan of care. If you have any questions or concerns regarding these findings, please do not hesitate to contact me at the office phone number checked above. Thank you for the referral.     Physician Signature:________________________________Date:__________________  By signing above, therapists plan is approved by physician      Patient: Angel Valle   : 1960   MRN: [id number]  Referring Physician:        Evaluation Date: 8/15/2019        SUBJECTIVE: Pt feels stiffness not much pan in knee at this time. He does not ice at home much. Current Pain Scale: 0-1/10    Type: []Constant   [x]Intermitment  []Radiating []Localized  []other:     Functional Limitations: []Sitting []Standing []Walking    [x]Squatting [x]Stairs            []ADL's  []Driving [x]Sports/Recreation  [x]Other:kneeling and work related activity      OBJECTIVE:   Observation:  SP edema improved, however pt exhibits new edema under and around patella this visit. Stressed the need to ice.   Palpation: Less tightness in quad and ITB this visit.        OBJECTIVE  Test used Initial score  7/15/19 Current Score  8/15/19   Pain Summary 0-1/10 4/10 1/10   Functional questionnaire LEFS 69% 26%                       ROM KE -5 -4     KF 90 125             Swelling (cm) IP  MP  SP 39  45  47 42  45  46.5                       Strength Quad tone Trace to poor Poor     KE   4+/5     KF   5/5     HF   4+/5     HAB   4+/5                  Gait: slightly antalgic until warmed up    Joint mobility: patella   []Normal    [x]Hypo due to edema   []Hyper    Palpation: Observation:  SP edema improved, however pt exhibits new edema under and around patella this visit. Stressed the need to ice. Palpation: Less tightness in quad and ITB this visit. ASSESSMENT: It was determined that patient has not time constraints that limit his treatment time any appt, he simply wants to be done so says he needs to go. It was stressed he needs to complete the treatment as well as ice/vaso and ESU as long as he is overly swollen    Short Term Goals: To be achieved in: 2 weeks  1. Independent in HEP and progression per patient tolerance, in order to prevent re-injury. MET  2. Patient will have a decrease in pain to facilitate improvement in movement, function, and ADLs as indicated by Functional Deficits. MET      Long Term Goals: To be achieved in: 8 weeks (9/15/19)  1. Disability index score of 35% or less for the LEFS to assist with reaching prior level of function. 2. Patient will demonstrate increased AROM to 0-125 to allow for proper joint functioning as indicated by patients Functional Deficits. MET  3. Patient will demonstrate an increase in Strength to good proximal hip/LE strength and control, 4+/5 in LE to allow for proper functional mobility as indicated by patients Functional Deficits. MET  4. Patient will return to daily functional activities without increased symptoms or restriction.    5. Pt will be able to navigate up and down stairs without AD (10 step flight) with proper pattern. (patient specific functional goal)          Response to Treatment:   [x]Patient is responding well to treatment and improvement is noted with regards  to goals   []Patient should continue to improve in reasonable time if they continue HEP   []Patient has plateaued and is no longer responding to skilled PT intervention    []Patient is getting worse and would benefit from return to referring MD   []Patient unable to adhere to

## 2019-08-15 NOTE — FLOWSHEET NOTE
Ecc.                               Inv. Hamstring Curls Bilat. 50# 2x10                              Ecc.                               Inv.        Soleus Press Bilat. Ecc.                           Inv.                             Ladders                Square               Jump/Hop  Low                      Med.                      High                                                            Modality GR 15'   Initials                             EP   Time spent one on one (workers comp)    Time spent with PT assistant

## 2019-08-20 ENCOUNTER — HOSPITAL ENCOUNTER (OUTPATIENT)
Dept: PHYSICAL THERAPY | Age: 59
Setting detail: THERAPIES SERIES
Discharge: HOME | End: 2019-08-20
Payer: COMMERCIAL

## 2019-08-20 PROCEDURE — 97016 VASOPNEUMATIC DEVICE THERAPY: CPT | Performed by: PHYSICAL THERAPIST

## 2019-08-20 PROCEDURE — 97140 MANUAL THERAPY 1/> REGIONS: CPT | Performed by: PHYSICAL THERAPIST

## 2019-08-20 PROCEDURE — 97110 THERAPEUTIC EXERCISES: CPT | Performed by: PHYSICAL THERAPIST

## 2019-08-20 NOTE — FLOWSHEET NOTE
with reaching prior level of function. 2. Patient will demonstrate increased AROM to 0-125 to allow for proper joint functioning as indicated by patients Functional Deficits. MET  3. Patient will demonstrate an increase in Strength to good proximal hip/LE strength and control, 4+/5 in LE to allow for proper functional mobility as indicated by patients Functional Deficits. MET  4. Patient will return to daily functional activities without increased symptoms or restriction. 5. Pt will be able to navigate up and down stairs without AD (10 step flight) with proper pattern. (patient specific functional goal)             Progression Towards Functional goals:  [x] Patient is progressing as expected towards functional goals listed. [] Progression is slowed due to complexities listed. [] Progression has been slowed due to co-morbidities. [] Plan just implemented, too soon to assess goals progression  [] Other:     ASSESSMENT: Jennifer Garcia exhibits tightness in his ITB and Hs today, due to over use of his LE this weekend and lessened quad tone and strength. He is progressing however this swelling in his knee appears to be inhibiting his final improvement to normal.  He is able to do al things now, stairs and yard work etc, and was able to spend the weekend camping without increased edema for the first time. He continues to try to leave PT without icing and is reminded that he needs to ice. I recommend he continue until is MD appt on 9/4/19 and at that time he will have maxed out medical necessity for PT and will be DCd to HEP. Treatment/Activity Tolerance:  [x] Patient tolerated treatment well [] Patient limited by fatique  [] Patient limited by pain  [] Patient limited by other medical complications  [x] Other: see assessment    Prognosis: [x] Good [] Fair  [] Poor    Patient Requires Follow-up: [x] Yes  [] No    PLAN:Cont to focus on quad and function, decrease edema.   [x] Continue per plan of care [] Alter current plan (see comments)  [] Plan of care initiated [] Hold pending MD visit [] Discharge    Electronically signed by: Austin Osullivan , [id number]

## 2019-08-22 ENCOUNTER — HOSPITAL ENCOUNTER (OUTPATIENT)
Dept: PHYSICAL THERAPY | Age: 59
Setting detail: THERAPIES SERIES
Discharge: HOME | End: 2019-08-22
Payer: COMMERCIAL

## 2019-08-22 PROCEDURE — 97016 VASOPNEUMATIC DEVICE THERAPY: CPT | Performed by: PHYSICAL THERAPIST

## 2019-08-22 PROCEDURE — 97110 THERAPEUTIC EXERCISES: CPT | Performed by: PHYSICAL THERAPIST

## 2019-08-22 PROCEDURE — 97140 MANUAL THERAPY 1/> REGIONS: CPT | Performed by: PHYSICAL THERAPIST

## 2019-08-26 ENCOUNTER — HOSPITAL ENCOUNTER (OUTPATIENT)
Dept: PHYSICAL THERAPY | Age: 59
Setting detail: THERAPIES SERIES
Discharge: HOME | End: 2019-08-26
Payer: COMMERCIAL

## 2019-08-26 PROCEDURE — 97016 VASOPNEUMATIC DEVICE THERAPY: CPT | Performed by: PHYSICAL THERAPIST

## 2019-08-26 PROCEDURE — 97140 MANUAL THERAPY 1/> REGIONS: CPT | Performed by: PHYSICAL THERAPIST

## 2019-08-26 PROCEDURE — 97110 THERAPEUTIC EXERCISES: CPT | Performed by: PHYSICAL THERAPIST

## 2019-08-29 ENCOUNTER — HOSPITAL ENCOUNTER (OUTPATIENT)
Dept: PHYSICAL THERAPY | Age: 59
Setting detail: THERAPIES SERIES
Discharge: HOME | End: 2019-08-29
Payer: COMMERCIAL

## 2019-08-29 PROCEDURE — 97110 THERAPEUTIC EXERCISES: CPT | Performed by: PHYSICAL THERAPIST

## 2019-08-29 NOTE — FLOWSHEET NOTE
[facility],  [address] [place]  Phone: [phone]  Fax [phone]        Physical Therapy Daily Treatment Note  Date:  2019    Patient Name:  Malcolm Ramirez    :  1960  MRN: [id number]       Date of Onset of injury/condition: sx 19    Medical/Treatment Diagnosis Information:  · Diagnosis: S83.231D (ICD-10-CM) - Complex tear of medial meniscus of right knee as current injury, subsequent encounter  · Treatment Diagnosis: s/p R knee PMM 19; R knee pain M25.561 ; R knee stiffness V77.868  Insurance/Certification information:  PT Insurance Information: 2019 [address] [address] [facility]  - $[id number]%-24PT/OT- EXP 19    Physician Information:  Referring Practitioner: Negro Malin MD    Plan of care signed (Y/N): Y       Date of Patient follow up with Physician: 19    Date of onset of PT:  7/15/19        OP NOTE Due: done 19  10th VISIT NOTE  Completed: 8/15/19 (POC update)not signed as of      Latex Allergy:  [x]NO      []YES  Preferred Language for Healthcare:   [x]English       []other:     Visit # Insurance Allowable Requires auth   14 24 EXP 19  Pt was asked regarding this and he said he hs same insurance still not      [x]no        []yes:     Pain level:   0-1/10     SUBJECTIVE:  Pt cont to say he has been over doing it at home. Had to ice this morning because he was sore from yesterday. OBJECTIVE:   Observation:  Edema decreased, quad tone increased.   Palpation:       OBJECTIVE  Test used Initial score  7/15/19 Current Score  19   Pain Summary 0-1/10 410 1/10   Functional questionnaire LEFS 69% 26%               ROM KE -5 -4    KF 90 125         Swelling (cm) IP  MP  SP 39  45  47 42  45  46.5               Strength Quad tone Trace to poor   FAIR+    KE  4+/5    KF  5/5    HF  4+/5    HAB  4+/5             RESTRICTIONS/PRECAUTIONS: none    Exercises/Interventions: lifting and ambulation.  [] (71776)Reviewed/Progressed HEP activities related to improving balance, coordination, kinesthetic sense, posture, motor skill, proprioception of core and extremity for functional self-care, mobility, lifting and ambulation. Manual Treatments:  PROM / STM / Oscillations-Mobs:  G-I, II, III, IV (PA's, Inf., Post.)  [] (93251) Provided manual therapy to mobilize upper and/or LE joints, spine, soft tissue/joints for the purpose of modulating pain, promoting relaxation,  increasing ROM, reducing/eliminating soft tissue swelling/inflammation/restriction, improving soft tissue extensibility and allowing for proper ROM for normal function with self care, mobility, lifting and ambulation. Modalities:  declined all    Charges:  Timed Code Treatment Minutes: 45'   Total Treatment Minutes: 45'     [] EVAL (LOW) 06028 (typically 20 minutes face-to-face)  [] EVAL (MOD) 91299 (typically 30 minutes face-to-face)  [] EVAL (HIGH) 05572 (typically 45 minutes face-to-face)  [] RE-EVAL     [x] DJ(46825) x 3 [] IONTO  [] NMR (02488) x    [x] VASO  [] Manual (25883) x      [] Other:  [] TA x      [] Mech Traction (50992)  [] ES(attended) (33290)      [] ES (un) (05813):     GOALS:   Short Term Goals: To be achieved in: 2 weeks  1. Independent in HEP and progression per patient tolerance, in order to prevent re-injury. MET  2. Patient will have a decrease in pain to facilitate improvement in movement, function, and ADLs as indicated by Functional Deficits. MET     Long Term Goals: To be achieved in: 8 weeks (9/15/19)  1. Disability index score of 35% or less for the LEFS to assist with reaching prior level of function. MET  2. Patient will demonstrate increased AROM to 0-125 to allow for proper joint functioning as indicated by patients Functional Deficits. MET  3.  Patient will demonstrate an increase in Strength to good proximal hip/LE strength and control, 4+/5 in LE to allow for proper functional

## 2019-08-29 NOTE — DISCHARGE SUMMARY
[facility],  [address]  Phone: [phone]  Fax [phone]       Physical Therapy Discharge  Date: 2019        Patient Name:  Pham Kelly    :  1960  MRN: [id number]  Referring Haley Kulkarni MD  Diagnosis:Complex tear of medial meniscus R knee                        ICD Code:S83.231D  [] Surgical [x] Conservative   Therapy Diagnosis/Practice Pattern:E/Rknee pain M25.561      Number of Comorbidities:  []0     [x]1-2    []3+  Total number of visits: 14   Reporting Period:   Beginning Date:7/15/19   End Date:19    OBJECTIVE  Test used Initial score  7/15/19 Current Score  19   Pain Summary 0-1/10 4/10 1/10   Functional questionnaire LEFS 69% 26%                       ROM KE -5 -4     KF 90 125             Swelling (cm) IP  MP  SP 39  45  47 42  45  46.5                       Strength Quad tone Trace to poor    FAIR+     KE   4+/5     KF   5/5     HF   4+/5     HAB   4+/5                         Treatment to date:  [x] Therapeutic Exercise    [x] Modalities:  [x] Therapeutic Activity             []Ultrasound            [x]Electrical Stimulation  [x] Gait Training     []Cervical Traction    [] Lumbar Traction  [x] Neuromuscular Re-education [x] Cold/hotpack         []Iontophoresis  [x] Instruction in HEP      Other:  [x] Manual Therapy                   []                       ?           []   Assessment:  [x] All Goals were achieved. [] The following goals were achieved (#'s):  [] The following goals were not achieved for the following reasons:/assessmen of improvement as it relates to each goal:  Short Term Goals: To be achieved in: 2 weeks  1. Independent in HEP and progression per patient tolerance, in order to prevent re-injury.  MET  2. Patient will have a decrease in pain to facilitate improvement in movement, function, and ADLs as indicated by Functional

## 2019-09-04 ENCOUNTER — OFFICE VISIT (OUTPATIENT)
Dept: ORTHOPEDIC SURGERY | Age: 59
End: 2019-09-04

## 2019-09-04 VITALS — WEIGHT: 236 LBS | HEIGHT: 72 IN | BODY MASS INDEX: 31.97 KG/M2

## 2019-09-04 DIAGNOSIS — S83.231D COMPLEX TEAR OF MEDIAL MENISCUS OF RIGHT KNEE AS CURRENT INJURY, SUBSEQUENT ENCOUNTER: Primary | ICD-10-CM

## 2019-09-04 PROCEDURE — 99024 POSTOP FOLLOW-UP VISIT: CPT | Performed by: ORTHOPAEDIC SURGERY

## 2019-09-04 NOTE — PROGRESS NOTES
errors to my attention for an addendum. All efforts were made to ensure that this office note is accurate.

## 2020-01-21 ENCOUNTER — OFFICE VISIT (OUTPATIENT)
Dept: FAMILY MEDICINE CLINIC | Age: 60
End: 2020-01-21
Payer: COMMERCIAL

## 2020-01-21 VITALS
HEIGHT: 74 IN | OXYGEN SATURATION: 96 % | WEIGHT: 239 LBS | SYSTOLIC BLOOD PRESSURE: 112 MMHG | DIASTOLIC BLOOD PRESSURE: 80 MMHG | HEART RATE: 87 BPM | BODY MASS INDEX: 30.67 KG/M2

## 2020-01-21 PROBLEM — E66.09 CLASS 1 OBESITY DUE TO EXCESS CALORIES WITHOUT SERIOUS COMORBIDITY WITH BODY MASS INDEX (BMI) OF 30.0 TO 30.9 IN ADULT: Status: ACTIVE | Noted: 2020-01-21

## 2020-01-21 PROBLEM — R53.83 OTHER FATIGUE: Status: ACTIVE | Noted: 2020-01-21

## 2020-01-21 PROBLEM — E66.811 CLASS 1 OBESITY DUE TO EXCESS CALORIES WITHOUT SERIOUS COMORBIDITY WITH BODY MASS INDEX (BMI) OF 30.0 TO 30.9 IN ADULT: Status: ACTIVE | Noted: 2020-01-21

## 2020-01-21 PROBLEM — I10 HYPERTENSION: Status: ACTIVE | Noted: 2020-01-21

## 2020-01-21 PROBLEM — N52.9 ERECTILE DYSFUNCTION: Status: ACTIVE | Noted: 2020-01-21

## 2020-01-21 PROBLEM — K21.9 GASTROESOPHAGEAL REFLUX DISEASE: Status: ACTIVE | Noted: 2020-01-21

## 2020-01-21 PROBLEM — R73.03 PRE-DIABETES: Status: ACTIVE | Noted: 2020-01-21

## 2020-01-21 PROCEDURE — 93010 ELECTROCARDIOGRAM REPORT: CPT | Performed by: FAMILY MEDICINE

## 2020-01-21 PROCEDURE — 99204 OFFICE O/P NEW MOD 45 MIN: CPT | Performed by: FAMILY MEDICINE

## 2020-01-21 PROCEDURE — 93000 ELECTROCARDIOGRAM COMPLETE: CPT | Performed by: FAMILY MEDICINE

## 2020-01-21 RX ORDER — LOSARTAN POTASSIUM 50 MG/1
50 TABLET ORAL DAILY
Qty: 90 TABLET | Refills: 3 | Status: SHIPPED | OUTPATIENT
Start: 2020-01-21 | End: 2020-12-15 | Stop reason: SDUPTHER

## 2020-01-21 RX ORDER — SILDENAFIL 50 MG/1
50 TABLET, FILM COATED ORAL PRN
Qty: 30 TABLET | Refills: 3 | Status: SHIPPED | OUTPATIENT
Start: 2020-01-21 | End: 2020-12-15

## 2020-01-21 ASSESSMENT — ENCOUNTER SYMPTOMS: BLOOD IN STOOL: 0

## 2020-01-21 ASSESSMENT — PATIENT HEALTH QUESTIONNAIRE - PHQ9
SUM OF ALL RESPONSES TO PHQ QUESTIONS 1-9: 0
1. LITTLE INTEREST OR PLEASURE IN DOING THINGS: 0
2. FEELING DOWN, DEPRESSED OR HOPELESS: 0
SUM OF ALL RESPONSES TO PHQ9 QUESTIONS 1 & 2: 0
SUM OF ALL RESPONSES TO PHQ QUESTIONS 1-9: 0

## 2020-01-21 NOTE — PROGRESS NOTES
performed by Drew Cruz MD at [facility] ARTHROSCOPY Left 2017    rotator cuff repair, 2 tendon decompression, debridement    TONSILLECTOMY AND ADENOIDECTOMY         Social History     Socioeconomic History    Marital status:      Spouse name: Not on file    Number of children: Not on file    Years of education: Not on file    Highest education level: Not on file   Occupational History    Not on file   Social Needs    Financial resource strain: Not on file    Food insecurity:     Worry: Not on file     Inability: Not on file    Transportation needs:     Medical: Not on file     Non-medical: Not on file   Tobacco Use    Smoking status: Former Smoker     Packs/day: 1.00     Years: 25.00     Pack years: 25.00     Start date: 1975     Last attempt to quit: 2000     Years since quittin.7    Smokeless tobacco: Never Used   Substance and Sexual Activity    Alcohol use:  Yes     Alcohol/week: 24.0 standard drinks     Types: 24 Cans of beer per week    Drug use: No    Sexual activity: Yes     Partners: Female   Lifestyle    Physical activity:     Days per week: Not on file     Minutes per session: Not on file    Stress: Not on file   Relationships    Social connections:     Talks on phone: Not on file     Gets together: Not on file     Attends Shinto service: Not on file     Active member of club or organization: Not on file     Attends meetings of clubs or organizations: Not on file     Relationship status: Not on file    Intimate partner violence:     Fear of current or ex partner: Not on file     Emotionally abused: Not on file     Physically abused: Not on file     Forced sexual activity: Not on file   Other Topics Concern    Not on file   Social History Narrative    Not on file        Family History   Problem Relation Age of Onset    No Known Problems Mother     Heart Disease Father         cardiac stent       Vitals:    20 0912   BP:

## 2020-01-22 ENCOUNTER — NURSE ONLY (OUTPATIENT)
Dept: FAMILY MEDICINE CLINIC | Age: 60
End: 2020-01-22
Payer: COMMERCIAL

## 2020-01-22 LAB
A/G RATIO: 1.9 (ref 1.1–2.2)
ALBUMIN SERPL-MCNC: 4.7 G/DL (ref 3.4–5)
ALP BLD-CCNC: 72 U/L (ref 40–129)
ALT SERPL-CCNC: 58 U/L (ref 10–40)
ANION GAP SERPL CALCULATED.3IONS-SCNC: 13 MMOL/L (ref 3–16)
AST SERPL-CCNC: 26 U/L (ref 15–37)
BILIRUB SERPL-MCNC: 0.3 MG/DL (ref 0–1)
BUN BLDV-MCNC: 16 MG/DL (ref 7–20)
CALCIUM SERPL-MCNC: 9.9 MG/DL (ref 8.3–10.6)
CHLORIDE BLD-SCNC: 99 MMOL/L (ref 99–110)
CHOLESTEROL, TOTAL: 163 MG/DL (ref 0–199)
CO2: 26 MMOL/L (ref 21–32)
CREAT SERPL-MCNC: 1 MG/DL (ref 0.8–1.3)
GFR AFRICAN AMERICAN: >60
GFR NON-AFRICAN AMERICAN: >60
GLOBULIN: 2.5 G/DL
GLUCOSE BLD-MCNC: 111 MG/DL (ref 70–99)
HCT VFR BLD CALC: 39.1 % (ref 40.5–52.5)
HDLC SERPL-MCNC: 43 MG/DL (ref 40–60)
HEMOGLOBIN: 13 G/DL (ref 13.5–17.5)
LDL CHOLESTEROL CALCULATED: 97 MG/DL
MCH RBC QN AUTO: 28.9 PG (ref 26–34)
MCHC RBC AUTO-ENTMCNC: 33.2 G/DL (ref 31–36)
MCV RBC AUTO: 87 FL (ref 80–100)
PDW BLD-RTO: 14.6 % (ref 12.4–15.4)
PLATELET # BLD: 166 K/UL (ref 135–450)
PMV BLD AUTO: 9.4 FL (ref 5–10.5)
POTASSIUM SERPL-SCNC: 4.9 MMOL/L (ref 3.5–5.1)
RBC # BLD: 4.49 M/UL (ref 4.2–5.9)
SODIUM BLD-SCNC: 138 MMOL/L (ref 136–145)
TOTAL PROTEIN: 7.2 G/DL (ref 6.4–8.2)
TRIGL SERPL-MCNC: 114 MG/DL (ref 0–150)
TSH REFLEX: 2.1 UIU/ML (ref 0.27–4.2)
VLDLC SERPL CALC-MCNC: 23 MG/DL
WBC # BLD: 6.1 K/UL (ref 4–11)

## 2020-01-22 PROCEDURE — 36415 COLL VENOUS BLD VENIPUNCTURE: CPT | Performed by: FAMILY MEDICINE

## 2020-01-22 NOTE — PROGRESS NOTES
Patient came into the office per physician's request for the following blood test(s): testosterone, cbc, cmp, lipid, a1c, and tsh.       Blood drawn in office by [facility]    # of tubes sent: 2 sst 1 lav

## 2020-01-23 LAB
ESTIMATED AVERAGE GLUCOSE: 125.5 MG/DL
HBA1C MFR BLD: 6 %

## 2020-01-24 LAB
SEX HORMONE BINDING GLOBULIN: 16 NMOL/L (ref 11–80)
TESTOSTERONE FREE-NONMALE: 80.7 PG/ML (ref 47–244)
TESTOSTERONE TOTAL: 290 NG/DL (ref 220–1000)

## 2020-01-31 ENCOUNTER — HOSPITAL ENCOUNTER (OUTPATIENT)
Age: 60
Discharge: HOME | End: 2020-01-31
Payer: COMMERCIAL

## 2020-01-31 LAB
BILIRUB SERPL-MCNC: 0.3 MG/DL (ref 0–1)
BILIRUBIN DIRECT: <0.2 MG/DL (ref 0–0.3)
BILIRUBIN, INDIRECT: NORMAL MG/DL (ref 0–1)
BLOOD SMEAR REVIEW: NORMAL
FERRITIN: 26.6 NG/ML (ref 30–400)
HAPTOGLOBIN: 152 MG/DL (ref 30–200)
HAV IGM SER IA-ACNC: NORMAL
HCT VFR BLD CALC: 38.7 % (ref 40.5–52.5)
HEPATITIS B CORE IGM ANTIBODY: NORMAL
HEPATITIS B SURFACE ANTIGEN INTERPRETATION: NORMAL
HEPATITIS C ANTIBODY INTERPRETATION: NORMAL
IMMATURE RETIC FRACT: 0.42 (ref 0.21–0.37)
IRON SATURATION: 16 % (ref 20–50)
IRON: 59 UG/DL (ref 59–158)
LACTATE DEHYDROGENASE: 187 U/L (ref 100–190)
RETICULOCYTE ABSOLUTE COUNT: 0.07 M/UL
RETICULOCYTE COUNT PCT: 1.47 % (ref 0.5–2.18)
TOTAL IRON BINDING CAPACITY: 370 UG/DL (ref 260–445)
TRANSFERRIN: 294 MG/DL (ref 200–360)

## 2020-01-31 PROCEDURE — 36415 COLL VENOUS BLD VENIPUNCTURE: CPT

## 2020-01-31 PROCEDURE — 82728 ASSAY OF FERRITIN: CPT

## 2020-01-31 PROCEDURE — 80074 ACUTE HEPATITIS PANEL: CPT

## 2020-01-31 PROCEDURE — 82247 BILIRUBIN TOTAL: CPT

## 2020-01-31 PROCEDURE — 83615 LACTATE (LD) (LDH) ENZYME: CPT

## 2020-01-31 PROCEDURE — 85045 AUTOMATED RETICULOCYTE COUNT: CPT

## 2020-01-31 PROCEDURE — 83540 ASSAY OF IRON: CPT

## 2020-01-31 PROCEDURE — 84466 ASSAY OF TRANSFERRIN: CPT

## 2020-01-31 PROCEDURE — 83010 ASSAY OF HAPTOGLOBIN QUANT: CPT

## 2020-01-31 PROCEDURE — 82248 BILIRUBIN DIRECT: CPT

## 2020-02-03 LAB — HEMATOLOGY PATH CONSULT: NORMAL

## 2020-02-07 ENCOUNTER — NURSE ONLY (OUTPATIENT)
Dept: FAMILY MEDICINE CLINIC | Age: 60
End: 2020-02-07
Payer: COMMERCIAL

## 2020-02-07 LAB
CONTROL: NORMAL
HEMOCCULT STL QL: NEGATIVE

## 2020-02-07 PROCEDURE — 82274 ASSAY TEST FOR BLOOD FECAL: CPT | Performed by: FAMILY MEDICINE

## 2020-02-13 ENCOUNTER — TELEPHONE (OUTPATIENT)
Dept: FAMILY MEDICINE CLINIC | Age: 60
End: 2020-02-13

## 2020-05-14 RX ORDER — ATORVASTATIN CALCIUM 20 MG/1
20 TABLET, FILM COATED ORAL DAILY
Qty: 90 TABLET | Refills: 0 | Status: CANCELLED | OUTPATIENT
Start: 2020-05-14

## 2020-05-21 ENCOUNTER — TELEPHONE (OUTPATIENT)
Dept: FAMILY MEDICINE CLINIC | Age: 60
End: 2020-05-21

## 2020-05-21 RX ORDER — ATORVASTATIN CALCIUM 20 MG/1
20 TABLET, FILM COATED ORAL DAILY
Qty: 90 TABLET | Refills: 3 | Status: CANCELLED | OUTPATIENT
Start: 2020-05-21

## 2020-05-21 RX ORDER — ATORVASTATIN CALCIUM 20 MG/1
20 TABLET, FILM COATED ORAL DAILY
Qty: 90 TABLET | Refills: 3 | Status: SHIPPED | OUTPATIENT
Start: 2020-05-21 | End: 2020-12-15 | Stop reason: SDUPTHER

## 2020-05-21 NOTE — TELEPHONE ENCOUNTER
Pt states he got his refill on metformin,but never got his atorvastatin 20 mg clive's camila pt was a little upset can we resend and call pt when this is done

## 2020-12-08 NOTE — TELEPHONE ENCOUNTER
Agnieszka req refill for patient on metformin 500mg tab      Last visit 1/21/20  No future  jacobo jensen
Last seen-1/21/20  Last filled-5/14/20  Future appt-12/15/20-New patient with Shante Peace. Physical, will be fasting for labs.
Plan: Patient will schedule E&S with Dr. Mandeep Claudio for removal.
Detail Level: Simple

## 2020-12-15 ENCOUNTER — OFFICE VISIT (OUTPATIENT)
Dept: FAMILY MEDICINE CLINIC | Age: 60
End: 2020-12-15
Payer: COMMERCIAL

## 2020-12-15 VITALS
OXYGEN SATURATION: 98 % | WEIGHT: 223.2 LBS | SYSTOLIC BLOOD PRESSURE: 120 MMHG | TEMPERATURE: 96.7 F | DIASTOLIC BLOOD PRESSURE: 82 MMHG | BODY MASS INDEX: 28.79 KG/M2 | HEART RATE: 76 BPM

## 2020-12-15 PROBLEM — D50.0 ANEMIA DUE TO CHRONIC BLOOD LOSS: Status: ACTIVE | Noted: 2020-02-28

## 2020-12-15 PROBLEM — K90.9 INTESTINAL MALABSORPTION: Status: ACTIVE | Noted: 2020-12-15

## 2020-12-15 LAB
A/G RATIO: 2 (ref 1.1–2.2)
ALBUMIN SERPL-MCNC: 4.9 G/DL (ref 3.4–5)
ALP BLD-CCNC: 60 U/L (ref 40–129)
ALT SERPL-CCNC: 59 U/L (ref 10–40)
ANION GAP SERPL CALCULATED.3IONS-SCNC: 12 MMOL/L (ref 3–16)
AST SERPL-CCNC: 29 U/L (ref 15–37)
BILIRUB SERPL-MCNC: 0.3 MG/DL (ref 0–1)
BUN BLDV-MCNC: 18 MG/DL (ref 7–20)
CALCIUM SERPL-MCNC: 9.9 MG/DL (ref 8.3–10.6)
CHLORIDE BLD-SCNC: 105 MMOL/L (ref 99–110)
CHOLESTEROL, TOTAL: 205 MG/DL (ref 0–199)
CO2: 24 MMOL/L (ref 21–32)
CREAT SERPL-MCNC: 0.8 MG/DL (ref 0.8–1.3)
GFR AFRICAN AMERICAN: >60
GFR NON-AFRICAN AMERICAN: >60
GLOBULIN: 2.4 G/DL
GLUCOSE BLD-MCNC: 122 MG/DL (ref 70–99)
HDLC SERPL-MCNC: 50 MG/DL (ref 40–60)
LDL CHOLESTEROL CALCULATED: 132 MG/DL
POTASSIUM SERPL-SCNC: 4.6 MMOL/L (ref 3.5–5.1)
SODIUM BLD-SCNC: 141 MMOL/L (ref 136–145)
TOTAL PROTEIN: 7.3 G/DL (ref 6.4–8.2)
TRIGL SERPL-MCNC: 116 MG/DL (ref 0–150)
VLDLC SERPL CALC-MCNC: 23 MG/DL

## 2020-12-15 PROCEDURE — 99214 OFFICE O/P EST MOD 30 MIN: CPT | Performed by: NURSE PRACTITIONER

## 2020-12-15 PROCEDURE — 90686 IIV4 VACC NO PRSV 0.5 ML IM: CPT | Performed by: NURSE PRACTITIONER

## 2020-12-15 PROCEDURE — 36415 COLL VENOUS BLD VENIPUNCTURE: CPT | Performed by: NURSE PRACTITIONER

## 2020-12-15 PROCEDURE — 90471 IMMUNIZATION ADMIN: CPT | Performed by: NURSE PRACTITIONER

## 2020-12-15 RX ORDER — ATORVASTATIN CALCIUM 20 MG/1
20 TABLET, FILM COATED ORAL DAILY
Qty: 90 TABLET | Refills: 1 | Status: SHIPPED | OUTPATIENT
Start: 2020-12-15 | End: 2021-06-15 | Stop reason: SDUPTHER

## 2020-12-15 RX ORDER — LOSARTAN POTASSIUM 50 MG/1
50 TABLET ORAL DAILY
Qty: 90 TABLET | Refills: 1 | Status: SHIPPED | OUTPATIENT
Start: 2020-12-15 | End: 2021-06-15 | Stop reason: SDUPTHER

## 2020-12-15 NOTE — PROGRESS NOTES
Patient: Shadi Arana is a 56 y.o. male who presents today with the following Chief Complaint(s):  Chief Complaint   Patient presents with   Efren Patel patient         HPI-this is a 68-year-old male patient establishing care with me here today. Has a history of hypertension in which he takes losartan 50 mg tablet daily. He is doing well, blood pressure is controlled, and he denies no side effects from the medication. He also is prediabetic in which he takes metformin 500 mg tablet twice daily. He does not have a glucometer to check his sugars. He denies no side effects from the medication and states he is doing well on it. He also has a history of dyslipidemia in which she takes Lipitor daily. He denies no side effects from the medication and is doing well. His last labs were back in January they were normal we will be repeating his labs today. He has a couple of complaints today the first complaint is a thickening and yellowing of the left great toenail. He states that the nail sometimes gets caught in his sock and pulls. I will be referring him to podiatry for them to look at this  He also has numerous skin moles on his back. He does admit to being in the sun a lot as he has a boat. He denied wearing sunscreen on the back area. We will be referring to dermatology for them to look at this and possibly biopsy the moles. He is also in need of his flu shot today. He will be getting this. We talked about his shingles vaccine he was instructed to talk to his pharmacy and to get this the next available time. He states that he does see [facility] for low iron. He states that he has had 2 transfusions they monitor this he sees them every 6 months.   Current Outpatient Medications   Medication Sig Dispense Refill    metFORMIN (GLUCOPHAGE) 500 MG tablet Take 1 tablet by mouth 2 times daily (with meals) 180 tablet 1  atorvastatin (LIPITOR) 20 MG tablet Take 1 tablet by mouth daily 90 tablet 1    losartan (COZAAR) 50 MG tablet Take 1 tablet by mouth daily 90 tablet 1    esomeprazole (NEXIUM) 20 MG delayed release capsule Take 1 capsule by mouth every morning (before breakfast) 90 capsule 3     No current facility-administered medications for this visit. Patient's past medical history, surgical history, family history, medications,  and allergies  were all reviewed and updated as appropriate today. Review of Systems   All other systems reviewed and are negative. Physical Exam  Vitals signs and nursing note reviewed. Constitutional:       Appearance: Normal appearance. He is well-developed. HENT:      Head: Normocephalic. Right Ear: Tympanic membrane and external ear normal.      Left Ear: Tympanic membrane and external ear normal.      Nose: Nose normal.      Mouth/Throat:      Mouth: Mucous membranes are moist.      Pharynx: Oropharynx is clear. No oropharyngeal exudate. Eyes:      Extraocular Movements: Extraocular movements intact. Conjunctiva/sclera: Conjunctivae normal.      Pupils: Pupils are equal, round, and reactive to light. Neck:      Musculoskeletal: Normal range of motion. Vascular: No carotid bruit. Cardiovascular:      Rate and Rhythm: Normal rate and regular rhythm. Heart sounds: Normal heart sounds. No murmur. Pulmonary:      Effort: Pulmonary effort is normal.      Breath sounds: Normal breath sounds. No wheezing. Abdominal:      General: Bowel sounds are normal.      Palpations: Abdomen is soft. There is no mass. Musculoskeletal: Normal range of motion. General: No swelling or tenderness. Right lower leg: No edema. Left lower leg: No edema. Lymphadenopathy:      Cervical: No cervical adenopathy. Skin:     General: Skin is warm and dry. Neurological:      General: No focal deficit present. Mental Status: He is alert and oriented to person, place, and time. Psychiatric:         Mood and Affect: Mood normal.         Behavior: Behavior normal.         Thought Content: Thought content normal.         Judgment: Judgment normal.       Vitals:    12/15/20 0709   BP: 120/82   Pulse: 76   Temp: 96.7 °F (35.9 °C)   SpO2: 98%       Assessment:  Encounter Diagnoses   Name Primary?  Hypertension, unspecified type Yes    Pre-diabetes     Dyslipidemia     Screening for HIV (human immunodeficiency virus)     Onychomycosis of great toe     Numerous skin moles     Need for prophylactic vaccination and inoculation against influenza        Controlled SubstancesMonitoring:  NA    Plan:  1. Hypertension, unspecified type  Stable  - Comprehensive Metabolic Panel  - losartan (COZAAR) 50 MG tablet; Take 1 tablet by mouth daily  Dispense: 90 tablet; Refill: 1    2. Pre-diabetes  Stable  - Hemoglobin A1C  - [facility] Vanita Heimlich, DPM[facility]  - metFORMIN (GLUCOPHAGE) 500 MG tablet; Take 1 tablet by mouth 2 times daily (with meals)  Dispense: 180 tablet; Refill: 1    3. Dyslipidemia  Stable  - Lipid Panel  - atorvastatin (LIPITOR) 20 MG tablet; Take 1 tablet by mouth daily  Dispense: 90 tablet; Refill: 1    4. Screening for HIV (human immunodeficiency virus)  - HIV Screen    5. Onychomycosis of great toe  New problem  - [facility] Ty Parsons DPM, [facility]iat[facility], [facility]    6. Numerous skin moles  Problem  - [facility]    7. Need for prophylactic vaccination and inoculation against influenza  - INFLUENZA, QUADV, 3 YRS AND OLDER, IM PF, PREFILL SYR OR SDV, 0.5ML (AFLURIA QUADV, PF)  He was instructed to return to the clinic in 6 months for recheck    SUE Arauz    Reviewed treatment plan with patient. Patient verbalized understanding to treatment plan and questions were answered. [address].  9 [facility], [address] [place] [address]

## 2020-12-16 ENCOUNTER — OFFICE VISIT (OUTPATIENT)
Dept: ORTHOPEDIC SURGERY | Age: 60
End: 2020-12-16
Payer: COMMERCIAL

## 2020-12-16 VITALS — BODY MASS INDEX: 28.62 KG/M2 | WEIGHT: 223 LBS | HEIGHT: 74 IN

## 2020-12-16 LAB
ESTIMATED AVERAGE GLUCOSE: 119.8 MG/DL
HBA1C MFR BLD: 5.8 %
HIV AG/AB: NORMAL
HIV ANTIGEN: NORMAL
HIV-1 ANTIBODY: NORMAL
HIV-2 AB: NORMAL

## 2020-12-16 PROCEDURE — 99202 OFFICE O/P NEW SF 15 MIN: CPT | Performed by: PODIATRIST

## 2021-02-11 ENCOUNTER — OFFICE VISIT (OUTPATIENT)
Dept: DERMATOLOGY | Age: 61
End: 2021-02-11
Payer: COMMERCIAL

## 2021-02-11 VITALS — TEMPERATURE: 98.2 F

## 2021-02-11 DIAGNOSIS — L91.8 INFLAMED ACROCHORDON: ICD-10-CM

## 2021-02-11 DIAGNOSIS — D22.9 MULTIPLE BENIGN NEVI: ICD-10-CM

## 2021-02-11 DIAGNOSIS — L81.4 SOLAR LENTIGINOSIS: ICD-10-CM

## 2021-02-11 DIAGNOSIS — L82.1 SEBORRHEIC KERATOSIS: Primary | ICD-10-CM

## 2021-02-11 PROCEDURE — 11200 RMVL SKIN TAGS UP TO&INC 15: CPT | Performed by: DERMATOLOGY

## 2021-02-11 PROCEDURE — 99203 OFFICE O/P NEW LOW 30 MIN: CPT | Performed by: DERMATOLOGY

## 2021-02-11 NOTE — PATIENT INSTRUCTIONS
Sun Protection Tips    Apply broad spectrum water resistant sunscreen with an SPF of at least 30 to exposed areas of the skin. Dont forget the ears and lips! Remember to reapply sunscreen about every 2 hours and after swimming or sweating. Wear sun protective clothing. Swim shirts (aka. rash guards) are a great idea and negates the need to reapply sunscreen in those areas. Seek the shade whenever possible especially between the hours of 10am and 4pm when the suns rays are the strongest.     Avoid tanning beds          Wear a wide brim hat while in the sun        Thanks for your visit! Feel free to send  Dr. Vandana Owens assistant, Larissa, a [employer] message/call for any questions, concerns or  to schedule your cosmetic procedures.

## 2021-02-11 NOTE — PROGRESS NOTES
[facility]  [facility] [place], , [address]       Lana Navarro  1960    56 y.o. male     Date of Visit: 2021    Chief Complaint:   Chief Complaint   Patient presents with    Skin Lesion     spots, UBSE        I was asked to see this patient by Dr. Nuris Saldana for numerous moles on his back. History of Present Illness:  Jose Renteria is a 56 y.o. male who presents with the chief complaint of establish care and for the followin. Patient requests waist up skin exam only today for spots. Denies skin concerns to lower body. Patient states his wife has noticed a new raised rough feeling bump to his left scapular back. Patient denies any pain, bleeding, pruritus. 2.  Many year history of multiple nevi on the head/neck, trunk and upper extremities, all present for many years. Denies new moles. Denies moles changing in size, shape, color. None associated w/ pain, bleeding, pruritus. 3.  Patient complains of 2 irritated skin tags to right lateral neck, request treatment. 4.Progressive freckling and lentigines located to sun exposed areas on head/neck, torso, upper extremities over several years. Denies changes in size, shape, or color. Admits to sun exposure in youth and adulthood without wearing sunscreen, hats, or protective clothing. Admits to hx of sunburns in youth. Patient states he is out boating on the [place] frequently, in recent years he is started to wear sunscreen regularly when outdoors. Also wears protective sun shirts. Usually tans easily. Review of Systems:  Constitutional: Reports general sense of well-being   Skin: No new or changing moles, no tendency to develop thick scars  Heme: No abnormal bruising or bleeding. Past Medical History, Family History, Surgical History, Medications and Allergies reviewed.     Past Skin Hx:   Patient denies past history of melanoma, NMSC, dysplastic nevi    PFHx: Denies hx of MM or NMSC Family History   Problem Relation Age of Onset    No Known Problems Mother     Heart Disease Father         cardiac stent     Past Medical History:   Diagnosis Date    Acid reflux     Diabetes mellitus ([facility] [address]     \"prediabetic\"    HTN (hypertension)     Hyperlipidemia      Past Surgical History:   Procedure Laterality Date    DENTAL SURGERY      extractions and implants    EYE SURGERY Bilateral     lasik    KNEE ARTHROSCOPY Right 2019    VIDEO ARTHROSCOPY RIGHT KNEE, PARTIAL MEDIAL MENISCECTOMY, CHONDROPLASTY performed by Marielena Porter MD at [place] [address] ARTHROSCOPY Left 2017    rotator cuff repair, 2 tendon decompression, debridement    TONSILLECTOMY AND ADENOIDECTOMY         No Known Allergies  Outpatient Medications Marked as Taking for the 21 encounter (Office Visit) with Balbir Palmer, DO   Medication Sig Dispense Refill    metFORMIN (GLUCOPHAGE) 500 MG tablet Take 1 tablet by mouth 2 times daily (with meals) 180 tablet 1    atorvastatin (LIPITOR) 20 MG tablet Take 1 tablet by mouth daily 90 tablet 1    losartan (COZAAR) 50 MG tablet Take 1 tablet by mouth daily 90 tablet 1    esomeprazole (NEXIUM) 20 MG delayed release capsule Take 1 capsule by mouth every morning (before breakfast) 90 capsule 3       Social History:   Social History     Socioeconomic History    Marital status:      Spouse name: Not on file    Number of children: Not on file    Years of education: Not on file    Highest education level: Not on file   Occupational History    Not on file   Social Needs    Financial resource strain: Not on file    Food insecurity     Worry: Not on file     Inability: Not on file    Transportation needs     Medical: Not on file     Non-medical: Not on file   Tobacco Use    Smoking status: Former Smoker     Packs/day: 1.00     Years: 25.00     Pack years: 25.00     Start date: 1975     Quit date: 2000     Years since quittin.7  Smokeless tobacco: Never Used   Substance and Sexual Activity    Alcohol use: Yes     Alcohol/week: 24.0 standard drinks     Types: 24 Cans of beer per week    Drug use: No    Sexual activity: Yes     Partners: Female   Lifestyle    Physical activity     Days per week: Not on file     Minutes per session: Not on file    Stress: Not on file   Relationships    Social connections     Talks on phone: Not on file     Gets together: Not on file     Attends [employer] service: Not on file     Active member of club or organization: Not on file     Attends meetings of clubs or organizations: Not on file     Relationship status: Not on file    Intimate partner violence     Fear of current or ex partner: Not on file     Emotionally abused: Not on file     Physically abused: Not on file     Forced sexual activity: Not on file   Other Topics Concern    Not on file   Social History Narrative    Not on file       Physical Examination     The following were examined and determined to be normal: Psych/Neuro, Scalp/hair, Head/face, Conjunctivae/eyelids, Gums/teeth/lips, , Breast/axilla/chest, Abdomen, Back, RUE, LUE and Nails/digits. The following were examined and determined to be abnormal: Neck    Aldridge phototype: 3    -Constitutional: Well appearing, no acute distress  -Neurological: Alert and oriented X 3  -Mood and Affect: Pleasant  Areas of skin examined as listed above:   1. Left scapular back- stuck-on appearing tan-brown verrucous papule  2. Scattered on the head,neck, trunk and upper extremities are multiple well-defined round and oval symmetric smoothly-bordered uniformly brown macules and papules; no bleeding nevi. 3. several discrete and coalescing few 2-4 mm round uniformly brown macules scattered to face, neck, and sun exposed areas on torso and upper extremities  4.  Right lateral neck- 2 pedunculated skin-colored and faint brownish-pink soft papules with mild erythema     Assessment and Plan 1. Seborrheic keratosis    2. Multiple benign nevi    3. Solar lentiginosis    4. Inflamed acrochordon        1. Seborrheic keratosis  -Reassurance provided to the patient regarding their chronic and benign nature. No treatment performed      2. Multiple benign nevi  Benign acquired melanocytic nevi  -Recommend monthly self skin exams   -Educated regarding the ABCDEs of melanoma detection   -Call for any new/changing moles or concerning lesions  -Reviewed sun protective behavior -- sun avoidance during the peak hours of the day, sun-protective clothing (including hat and sunglasses), sunscreen use (water resistant, broad spectrum, SPF at least 30, need for reapplication every 1.5 to 2 hours), avoidance of tanning beds   -Plan: Observation with annual skin checks (earlier if indicated) performed in office to monitor current nevi and to assess for new lesions. 3. Solar lentiginosis  Solar lentigines  -Edu re: benignity, relationship w/ chronic cumulative sun exposure, darkening w/ unprotected sun exposure  -Reviewed sun protective behavior -- sun avoidance during the peak hours of the day, sun-protective clothing (including hat and sunglasses), sunscreen use (water resistant, broad spectrum, SPF at least 30, need for reapplication every 2 to 3 hours), avoidance of tanning beds   Observation, pt to call if changes in size, shape, color or experiences bleeding/pain/itching        4. Inflamed acrochordon   -Patient educated about the benign nature of skin tags. No treatment is necessary. Due to local discomfort, patient elected cryotherapy for treatment  -Verbal consent obtained after risks (infection, bleeding, scar), benefits and alternatives explained. 2 lesion(s) treated w/ 1 cycle(s) of liquid nitrogen for 3 seconds. The patient's consent was obtained and the patient was educated regarding the potential risks of blister formation, discomfort, darker or lighter pigmentary change, crusting, scar, and infection. Wound care was discussed. Return to Clinic:  1 year skin exam  Discussed plan with patient and/or primary caretaker. Patient to call clinic with any questions / concerns. Reviewed proper use and side effects of treatment(s) and/or medication(s) with patient and/or primary caretaker. AVS provided or is available on [facility]   Note is transcribed using voice recognition software. Inadvertent computerized transcription errors may be present.

## 2021-06-15 ENCOUNTER — OFFICE VISIT (OUTPATIENT)
Dept: FAMILY MEDICINE CLINIC | Age: 61
End: 2021-06-15
Payer: COMMERCIAL

## 2021-06-15 VITALS
OXYGEN SATURATION: 97 % | HEIGHT: 72 IN | BODY MASS INDEX: 31.26 KG/M2 | DIASTOLIC BLOOD PRESSURE: 78 MMHG | TEMPERATURE: 97.5 F | SYSTOLIC BLOOD PRESSURE: 128 MMHG | WEIGHT: 230.8 LBS | HEART RATE: 71 BPM

## 2021-06-15 DIAGNOSIS — R73.03 PRE-DIABETES: ICD-10-CM

## 2021-06-15 DIAGNOSIS — I10 HYPERTENSION, UNSPECIFIED TYPE: Primary | ICD-10-CM

## 2021-06-15 DIAGNOSIS — K21.9 GASTROESOPHAGEAL REFLUX DISEASE WITHOUT ESOPHAGITIS: ICD-10-CM

## 2021-06-15 DIAGNOSIS — Z12.11 SCREEN FOR COLON CANCER: ICD-10-CM

## 2021-06-15 DIAGNOSIS — E78.5 DYSLIPIDEMIA: ICD-10-CM

## 2021-06-15 PROBLEM — R05.9 COUGH: Status: ACTIVE | Noted: 2021-06-15

## 2021-06-15 PROBLEM — T46.4X5A ADVERSE EFFECT OF ANGIOTENSIN-CONVERTING ENZYME INHIBITOR: Status: ACTIVE | Noted: 2021-06-15

## 2021-06-15 LAB — HBA1C MFR BLD: 5.9 %

## 2021-06-15 PROCEDURE — 83036 HEMOGLOBIN GLYCOSYLATED A1C: CPT | Performed by: NURSE PRACTITIONER

## 2021-06-15 PROCEDURE — 99214 OFFICE O/P EST MOD 30 MIN: CPT | Performed by: NURSE PRACTITIONER

## 2021-06-15 RX ORDER — ATORVASTATIN CALCIUM 20 MG/1
20 TABLET, FILM COATED ORAL DAILY
Qty: 90 TABLET | Refills: 1 | Status: SHIPPED | OUTPATIENT
Start: 2021-06-15 | End: 2021-12-16 | Stop reason: SDUPTHER

## 2021-06-15 RX ORDER — LOSARTAN POTASSIUM 50 MG/1
50 TABLET ORAL DAILY
Qty: 90 TABLET | Refills: 1 | Status: SHIPPED | OUTPATIENT
Start: 2021-06-15 | End: 2021-12-16 | Stop reason: SDUPTHER

## 2021-06-15 ASSESSMENT — PATIENT HEALTH QUESTIONNAIRE - PHQ9
SUM OF ALL RESPONSES TO PHQ QUESTIONS 1-9: 0
2. FEELING DOWN, DEPRESSED OR HOPELESS: 0
SUM OF ALL RESPONSES TO PHQ9 QUESTIONS 1 & 2: 0
1. LITTLE INTEREST OR PLEASURE IN DOING THINGS: 0

## 2021-06-15 NOTE — PROGRESS NOTES
No current facility-administered medications for this visit. Patient's past medical history, surgical history, family history, medications,  and allergies  were all reviewed and updated as appropriate today. Review of Systems   All other systems reviewed and are negative. Physical Exam  Vitals and nursing note reviewed. Constitutional:       Appearance: Normal appearance. He is well-developed. HENT:      Head: Normocephalic. Right Ear: External ear normal.      Left Ear: External ear normal.      Nose: Nose normal.      Mouth/Throat:      Mouth: Mucous membranes are moist.      Pharynx: Oropharynx is clear. Eyes:      Conjunctiva/sclera: Conjunctivae normal.      Pupils: Pupils are equal, round, and reactive to light. Cardiovascular:      Rate and Rhythm: Normal rate and regular rhythm. Heart sounds: Normal heart sounds. No murmur heard. Pulmonary:      Effort: Pulmonary effort is normal.      Breath sounds: Normal breath sounds. No wheezing or rhonchi. Abdominal:      General: Bowel sounds are normal.      Palpations: Abdomen is soft. There is no mass. Tenderness: There is no abdominal tenderness. Musculoskeletal:         General: Normal range of motion. Cervical back: Normal range of motion and neck supple. No tenderness. Lymphadenopathy:      Cervical: No cervical adenopathy. Skin:     General: Skin is warm and dry. Neurological:      General: No focal deficit present. Mental Status: He is alert and oriented to person, place, and time. Psychiatric:         Mood and Affect: Mood normal.         Behavior: Behavior normal.         Thought Content: Thought content normal.         Judgment: Judgment normal.       Vitals:    06/15/21 0655   BP: 128/78   Pulse: 71   Temp: 97.5 °F (36.4 °C)   SpO2: 97%       Assessment:  Encounter Diagnoses   Name Primary?     Hypertension, unspecified type Yes    Dyslipidemia     Gastroesophageal reflux disease without esophagitis     Pre-diabetes     Screen for colon cancer        Controlled SubstancesMonitoring:  NA    Plan:  1. Hypertension, unspecified type  Stable  - losartan (COZAAR) 50 MG tablet; Take 1 tablet by mouth daily  Dispense: 90 tablet; Refill: 1    2. Dyslipidemia  Stable  - atorvastatin (LIPITOR) 20 MG tablet; Take 1 tablet by mouth daily  Dispense: 90 tablet; Refill: 1    3. Gastroesophageal reflux disease without esophagitis  Stable  - esomeprazole (NEXIUM) 20 MG delayed release capsule; Take 1 capsule by mouth every morning (before breakfast)  Dispense: 90 capsule; Refill: 1    4. Pre-diabetes  Stable  - POCT glycosylated hemoglobin (Hb A1C)-5.9% today  - metFORMIN (GLUCOPHAGE) 500 MG tablet; Take 1 tablet by mouth 2 times daily (with meals)  Dispense: 180 tablet; Refill: 1    5. Screen for colon cancer  - [facility] - Chantale Ibarra MD, Gastroenterology, [facility] Naranjo - CNP, FNP    Reviewed treatment plan with patient. Patient verbalized understanding to treatment plan and questions were answered. [address] [facility] [address].  Angie, [address]

## 2021-07-15 PROBLEM — R05.9 COUGH: Status: RESOLVED | Noted: 2021-06-15 | Resolved: 2021-07-15

## 2021-12-16 ENCOUNTER — OFFICE VISIT (OUTPATIENT)
Dept: FAMILY MEDICINE CLINIC | Age: 61
End: 2021-12-16
Payer: COMMERCIAL

## 2021-12-16 VITALS
BODY MASS INDEX: 30.91 KG/M2 | OXYGEN SATURATION: 96 % | HEIGHT: 72 IN | SYSTOLIC BLOOD PRESSURE: 142 MMHG | DIASTOLIC BLOOD PRESSURE: 92 MMHG | HEART RATE: 77 BPM | WEIGHT: 228.2 LBS

## 2021-12-16 DIAGNOSIS — Z12.11 SCREENING FOR MALIGNANT NEOPLASM OF COLON: ICD-10-CM

## 2021-12-16 DIAGNOSIS — I10 HYPERTENSION, UNSPECIFIED TYPE: ICD-10-CM

## 2021-12-16 DIAGNOSIS — R73.03 PRE-DIABETES: ICD-10-CM

## 2021-12-16 DIAGNOSIS — E78.5 DYSLIPIDEMIA: ICD-10-CM

## 2021-12-16 DIAGNOSIS — Z76.89 ENCOUNTER TO ESTABLISH CARE: Primary | ICD-10-CM

## 2021-12-16 DIAGNOSIS — Z23 NEED FOR INFLUENZA VACCINATION: ICD-10-CM

## 2021-12-16 DIAGNOSIS — K21.9 GASTROESOPHAGEAL REFLUX DISEASE WITHOUT ESOPHAGITIS: ICD-10-CM

## 2021-12-16 PROCEDURE — 90674 CCIIV4 VAC NO PRSV 0.5 ML IM: CPT | Performed by: REGISTERED NURSE

## 2021-12-16 PROCEDURE — 99214 OFFICE O/P EST MOD 30 MIN: CPT | Performed by: REGISTERED NURSE

## 2021-12-16 PROCEDURE — 90471 IMMUNIZATION ADMIN: CPT | Performed by: REGISTERED NURSE

## 2021-12-16 RX ORDER — ATORVASTATIN CALCIUM 20 MG/1
20 TABLET, FILM COATED ORAL DAILY
Qty: 90 TABLET | Refills: 1 | Status: SHIPPED | OUTPATIENT
Start: 2021-12-16 | End: 2022-10-31

## 2021-12-16 RX ORDER — LOSARTAN POTASSIUM 50 MG/1
50 TABLET ORAL DAILY
Qty: 90 TABLET | Refills: 1 | Status: SHIPPED | OUTPATIENT
Start: 2021-12-16 | End: 2022-08-01

## 2021-12-16 ASSESSMENT — ENCOUNTER SYMPTOMS
ABDOMINAL PAIN: 0
NAUSEA: 0
SHORTNESS OF BREATH: 0
CONSTIPATION: 0
COUGH: 0
DIARRHEA: 0

## 2021-12-16 NOTE — PATIENT INSTRUCTIONS
Check blood pressure 3 times a week for the next couple of weeks. If it is consistently elevated call the office. Return in month for BP check.

## 2021-12-16 NOTE — PROGRESS NOTES
Patient: Crystal Guzman is a 59 y.o. male who presents today with the following Chief Complaint(s):  Chief Complaint   Patient presents with    Established New Doctor     Est Care-Prev. DN Pt. 6 month f/u       Assessment/Plan:  1. Encounter to establish care  He is a pleasant 78-year-old male who is here to establish care. He is not fasting today and will have his blood work drawn once he is fasting for 10 to 12 hours. 2. Pre-diabetes    - Hemoglobin A1C; Future  - metFORMIN (GLUCOPHAGE) 500 MG tablet; Take 1 tablet by mouth 2 times daily (with meals)  Dispense: 180 tablet; Refill: 1    3. Hypertension, unspecified type  Blood pressure slightly elevated today. He says that he did drink 6 or 7 beers last night and has not been eating well. Instructed him to check his blood pressure 2-3 times per week and return in 1 month for blood pressure check. He will call for consistently elevated blood pressures. - Comprehensive Metabolic Panel; Future  - losartan (COZAAR) 50 MG tablet; Take 1 tablet by mouth daily  Dispense: 90 tablet; Refill: 1    4. Dyslipidemia    - Lipid Panel; Future  - atorvastatin (LIPITOR) 20 MG tablet; Take 1 tablet by mouth daily  Dispense: 90 tablet; Refill: 1    5. Need for influenza vaccination  He would like a flu vaccine. - INFLUENZA, MDCK QUADV, 2 YRS AND OLDER, IM, PF, PREFILL SYR OR SDV, 0.5ML (FLUCELVAX QUADV, PF)    6. Screening for malignant neoplasm of colon  Make an appointment for colonoscopy. - [facility] - Niall West MD, Gastroenterology, [facility]    7. Gastroesophageal reflux disease without esophagitis  Stable continue esomeprazole. - esomeprazole (NEXIUM) 20 MG delayed release capsule; Take 1 capsule by mouth every morning (before breakfast)  Dispense: 90 capsule; Refill: 1        Return in about 4 weeks (around 1/13/2022) for BP check. HPI:     He is here to establish care.   He has a history of high blood pressure, prediabetes, gastroesophageal reflux disease, anemia-which she sees [facility] for and hyperlipidemia. He says that he is doing well overall. He does drink beer occasionally. He says he believes his blood pressure slightly elevated because he had 6 or 7 beers yesterday. He says this is not typical of him. Last year he had his weight down to 205 pounds. He says he notes that he needs to work on his nutrition. He says that he and his wife are planning to start using a tracking salvador to help with his nutrition and weight loss. Current Outpatient Medications   Medication Sig Dispense Refill    metFORMIN (GLUCOPHAGE) 500 MG tablet Take 1 tablet by mouth 2 times daily (with meals) 180 tablet 1    atorvastatin (LIPITOR) 20 MG tablet Take 1 tablet by mouth daily 90 tablet 1    losartan (COZAAR) 50 MG tablet Take 1 tablet by mouth daily 90 tablet 1    esomeprazole (NEXIUM) 20 MG delayed release capsule Take 1 capsule by mouth every morning (before breakfast) 90 capsule 1    Multiple Vitamins-Minerals (CENTRUM SILVER 50+MEN) TABS Take 1 tablet by mouth daily       No current facility-administered medications for this visit. Review of Systems   Constitutional: Negative for fatigue and fever. HENT: Negative. Eyes: Negative for visual disturbance. Respiratory: Negative for cough and shortness of breath. Cardiovascular: Negative for chest pain, palpitations and leg swelling. Gastrointestinal: Negative for abdominal pain, constipation, diarrhea and nausea. Genitourinary: Negative for difficulty urinating. Musculoskeletal: Negative. Skin: Negative. Neurological: Negative for dizziness, light-headedness and headaches. Hematological: Negative. Psychiatric/Behavioral: Negative. Vitals:    12/16/21 0945 12/16/21 1017   BP: (!) 140/84 (!) 142/92   Pulse: 77    SpO2: 96%    Weight: 228 lb 3.2 oz (103.5 kg)    Height: 6' (1.829 m)      Physical Exam  Vitals reviewed.    Constitutional:       General: He is not in acute distress. Appearance: Normal appearance. He is not ill-appearing, toxic-appearing or diaphoretic. HENT:      Head: Normocephalic and atraumatic. Right Ear: Tympanic membrane, ear canal and external ear normal. There is no impacted cerumen. Left Ear: Tympanic membrane, ear canal and external ear normal. There is no impacted cerumen. Nose: Nose normal. No congestion or rhinorrhea. Mouth/Throat:      Mouth: Mucous membranes are moist.      Pharynx: Oropharynx is clear. No oropharyngeal exudate or posterior oropharyngeal erythema. Eyes:      General:         Right eye: No discharge. Left eye: No discharge. Extraocular Movements: Extraocular movements intact. Conjunctiva/sclera: Conjunctivae normal.      Pupils: Pupils are equal, round, and reactive to light. Cardiovascular:      Rate and Rhythm: Normal rate and regular rhythm. Pulses: Normal pulses. Heart sounds: Normal heart sounds. No murmur heard. No friction rub. No gallop. Pulmonary:      Effort: Pulmonary effort is normal. No respiratory distress. Breath sounds: Normal breath sounds. No stridor. No wheezing, rhonchi or rales. Abdominal:      General: There is no distension. Palpations: There is no mass. Tenderness: There is no abdominal tenderness. There is no guarding or rebound. Hernia: No hernia is present. Musculoskeletal:         General: No swelling or tenderness. Normal range of motion. Cervical back: Normal range of motion and neck supple. No tenderness. Right lower leg: No edema. Left lower leg: No edema. Lymphadenopathy:      Cervical: No cervical adenopathy. Skin:     General: Skin is warm and dry. Capillary Refill: Capillary refill takes less than 2 seconds. Coloration: Skin is not jaundiced or pale. Findings: No erythema. Neurological:      General: No focal deficit present.       Mental Status: He is alert and oriented to person, place, and time. Psychiatric:         Mood and Affect: Mood normal.         Behavior: Behavior normal.         Thought Content: Thought content normal.         Judgment: Judgment normal.          Patient's past medical history, surgical history, family history, medications,  and allergies  were all reviewed and updated as appropriate today. Patient Active Problem List   Diagnosis    Gastroesophageal reflux disease    Hypertension    Pre-diabetes    Class 1 obesity due to excess calories without serious comorbidity with body mass index (BMI) of 30.0 to 30.9 in adult    Other fatigue    Erectile dysfunction    Anemia due to chronic blood loss    Intestinal malabsorption    Adverse effect of angiotensin-converting enzyme inhibitor     Past Medical History:   Diagnosis Date    Acid reflux     Diabetes mellitus ([facility] [address]     \"prediabetic\"    HTN (hypertension)     Hyperlipidemia       Past Surgical History:   Procedure Laterality Date    DENTAL SURGERY      extractions and implants    EYE SURGERY Bilateral     lasik    KNEE ARTHROSCOPY Right 7/11/2019    VIDEO ARTHROSCOPY RIGHT KNEE, PARTIAL MEDIAL MENISCECTOMY, CHONDROPLASTY performed by Niles Love MD at [place] [id number] ARTHROSCOPY Left 08/22/2017    rotator cuff repair, 2 tendon decompression, debridement    TONSILLECTOMY AND ADENOIDECTOMY         Family History   Problem Relation Age of Onset    No Known Problems Mother     Heart Disease Father         cardiac stent      No Known Allergies    This chart was generated using the [employer] dictation system. I created this record but it may contain dictation errors due to the limitation of the software.

## 2021-12-17 DIAGNOSIS — I10 HYPERTENSION, UNSPECIFIED TYPE: ICD-10-CM

## 2021-12-17 DIAGNOSIS — R73.03 PRE-DIABETES: ICD-10-CM

## 2021-12-17 DIAGNOSIS — E78.5 DYSLIPIDEMIA: ICD-10-CM

## 2021-12-17 LAB
A/G RATIO: 1.9 (ref 1.1–2.2)
ALBUMIN SERPL-MCNC: 4.8 G/DL (ref 3.4–5)
ALP BLD-CCNC: 75 U/L (ref 40–129)
ALT SERPL-CCNC: 65 U/L (ref 10–40)
ANION GAP SERPL CALCULATED.3IONS-SCNC: 13 MMOL/L (ref 3–16)
AST SERPL-CCNC: 28 U/L (ref 15–37)
BILIRUB SERPL-MCNC: 0.3 MG/DL (ref 0–1)
BUN BLDV-MCNC: 17 MG/DL (ref 7–20)
CALCIUM SERPL-MCNC: 9.4 MG/DL (ref 8.3–10.6)
CHLORIDE BLD-SCNC: 104 MMOL/L (ref 99–110)
CHOLESTEROL, TOTAL: 178 MG/DL (ref 0–199)
CO2: 23 MMOL/L (ref 21–32)
CREAT SERPL-MCNC: 0.9 MG/DL (ref 0.8–1.3)
GFR AFRICAN AMERICAN: >60
GFR NON-AFRICAN AMERICAN: >60
GLUCOSE BLD-MCNC: 117 MG/DL (ref 70–99)
HDLC SERPL-MCNC: 46 MG/DL (ref 40–60)
LDL CHOLESTEROL CALCULATED: 106 MG/DL
POTASSIUM SERPL-SCNC: 4.9 MMOL/L (ref 3.5–5.1)
SODIUM BLD-SCNC: 140 MMOL/L (ref 136–145)
TOTAL PROTEIN: 7.3 G/DL (ref 6.4–8.2)
TRIGL SERPL-MCNC: 131 MG/DL (ref 0–150)
VLDLC SERPL CALC-MCNC: 26 MG/DL

## 2021-12-18 LAB
ESTIMATED AVERAGE GLUCOSE: 125.5 MG/DL
HBA1C MFR BLD: 6 %

## 2021-12-20 ENCOUNTER — TELEPHONE (OUTPATIENT)
Dept: ADMINISTRATIVE | Age: 61
End: 2021-12-20

## 2022-01-13 ENCOUNTER — OFFICE VISIT (OUTPATIENT)
Dept: FAMILY MEDICINE CLINIC | Age: 62
End: 2022-01-13
Payer: COMMERCIAL

## 2022-01-13 VITALS
DIASTOLIC BLOOD PRESSURE: 82 MMHG | SYSTOLIC BLOOD PRESSURE: 130 MMHG | BODY MASS INDEX: 31.59 KG/M2 | OXYGEN SATURATION: 98 % | WEIGHT: 233.2 LBS | HEIGHT: 72 IN | HEART RATE: 73 BPM

## 2022-01-13 DIAGNOSIS — I10 HYPERTENSION, UNSPECIFIED TYPE: Primary | ICD-10-CM

## 2022-01-13 DIAGNOSIS — R73.03 PRE-DIABETES: ICD-10-CM

## 2022-01-13 PROCEDURE — 99213 OFFICE O/P EST LOW 20 MIN: CPT | Performed by: REGISTERED NURSE

## 2022-01-13 ASSESSMENT — ENCOUNTER SYMPTOMS: SHORTNESS OF BREATH: 0

## 2022-01-13 NOTE — PROGRESS NOTES
Patient: Natalia Sharif is a 59 y.o. male who presents today with the following Chief Complaint(s):  Chief Complaint   Patient presents with    Hypertension     4 week follow up       Assessment/Plan:  1. Hypertension, unspecified type  Stable. Continue losartan. 2. Pre-diabetes  Stable. Continue metformin, tracking nutrition and initiate exercise routine        Return in about 6 months (around 7/13/2022). HPI:     He is here for blood pressure check. He has decreased his alcohol intake. He is working on a nutrition plan and is tracking his nutrition. He recently got a rowing machine and he and his wife are starting an exercise routine. He denies chest pain, headaches, blurred vision, dizziness or shortness of breath. He says that he is doing well overall. Current Outpatient Medications   Medication Sig Dispense Refill    metFORMIN (GLUCOPHAGE) 500 MG tablet Take 1 tablet by mouth 2 times daily (with meals) 180 tablet 1    atorvastatin (LIPITOR) 20 MG tablet Take 1 tablet by mouth daily 90 tablet 1    losartan (COZAAR) 50 MG tablet Take 1 tablet by mouth daily 90 tablet 1    esomeprazole (NEXIUM) 20 MG delayed release capsule Take 1 capsule by mouth every morning (before breakfast) 90 capsule 1    Multiple Vitamins-Minerals (CENTRUM SILVER 50+MEN) TABS Take 1 tablet by mouth daily       No current facility-administered medications for this visit. Review of Systems   Constitutional: Negative for fatigue. Eyes: Negative for visual disturbance. Respiratory: Negative for shortness of breath. Cardiovascular: Negative for chest pain. Neurological: Negative for dizziness, light-headedness and headaches. Vitals:    01/13/22 0756   BP: 130/82   Pulse: 73   SpO2: 98%   Weight: 233 lb 3.2 oz (105.8 kg)   Height: 6' (1.829 m)     Physical Exam  Constitutional:       General: He is not in acute distress. Appearance: Normal appearance. He is not ill-appearing.    HENT: Head: Normocephalic and atraumatic. Eyes:      Extraocular Movements: Extraocular movements intact. Conjunctiva/sclera: Conjunctivae normal.      Pupils: Pupils are equal, round, and reactive to light. Cardiovascular:      Rate and Rhythm: Normal rate and regular rhythm. Pulses: Normal pulses. Heart sounds: No murmur heard. No friction rub. No gallop. Pulmonary:      Effort: Pulmonary effort is normal. No respiratory distress. Breath sounds: No stridor. No wheezing, rhonchi or rales. Musculoskeletal:         General: Normal range of motion. Cervical back: Normal range of motion. Right lower leg: No edema. Left lower leg: No edema. Skin:     General: Skin is warm and dry. Coloration: Skin is not jaundiced or pale. Findings: No erythema. Neurological:      General: No focal deficit present. Mental Status: He is alert and oriented to person, place, and time. Psychiatric:         Mood and Affect: Mood normal.         Behavior: Behavior normal.         Thought Content: Thought content normal.         Judgment: Judgment normal.            Patient's past medical history, surgical history, family history, medications,  and allergies  were all reviewed and updated as appropriate today.     Patient Active Problem List   Diagnosis    Gastroesophageal reflux disease    Hypertension    Pre-diabetes    Class 1 obesity due to excess calories without serious comorbidity with body mass index (BMI) of 30.0 to 30.9 in adult    Other fatigue    Erectile dysfunction    Anemia due to chronic blood loss    Intestinal malabsorption    Adverse effect of angiotensin-converting enzyme inhibitor     Past Medical History:   Diagnosis Date    Acid reflux     Diabetes mellitus ([facility] [address]     \"prediabetic\"    HTN (hypertension)     Hyperlipidemia       Past Surgical History:   Procedure Laterality Date    DENTAL SURGERY      extractions and implants    EYE SURGERY Bilateral lasik    KNEE ARTHROSCOPY Right 7/11/2019    VIDEO ARTHROSCOPY RIGHT KNEE, PARTIAL MEDIAL MENISCECTOMY, CHONDROPLASTY performed by Bella Flores MD at [facility] . ARTHROSCOPY Left 08/22/2017    rotator cuff repair, 2 tendon decompression, debridement    TONSILLECTOMY AND ADENOIDECTOMY         Family History   Problem Relation Age of Onset    No Known Problems Mother     Heart Disease Father         cardiac stent      No Known Allergies    This chart was generated using the [address] [place] [address] dictation system. I created this record but it may contain dictation errors due to the limitation of the software.

## 2022-07-13 ENCOUNTER — OFFICE VISIT (OUTPATIENT)
Dept: FAMILY MEDICINE CLINIC | Age: 62
End: 2022-07-13
Payer: COMMERCIAL

## 2022-07-13 VITALS
BODY MASS INDEX: 31.37 KG/M2 | SYSTOLIC BLOOD PRESSURE: 126 MMHG | HEART RATE: 72 BPM | HEIGHT: 72 IN | OXYGEN SATURATION: 96 % | WEIGHT: 231.6 LBS | DIASTOLIC BLOOD PRESSURE: 80 MMHG

## 2022-07-13 DIAGNOSIS — Z12.5 SCREENING FOR PROSTATE CANCER: ICD-10-CM

## 2022-07-13 DIAGNOSIS — R73.03 PRE-DIABETES: Primary | ICD-10-CM

## 2022-07-13 DIAGNOSIS — I10 HYPERTENSION, UNSPECIFIED TYPE: ICD-10-CM

## 2022-07-13 DIAGNOSIS — D50.0 ANEMIA DUE TO CHRONIC BLOOD LOSS: ICD-10-CM

## 2022-07-13 LAB
A/G RATIO: 2.3 (ref 1.1–2.2)
ALBUMIN SERPL-MCNC: 5 G/DL (ref 3.4–5)
ALP BLD-CCNC: 67 U/L (ref 40–129)
ALT SERPL-CCNC: 57 U/L (ref 10–40)
ANION GAP SERPL CALCULATED.3IONS-SCNC: 15 MMOL/L (ref 3–16)
AST SERPL-CCNC: 28 U/L (ref 15–37)
BASOPHILS ABSOLUTE: 0 K/UL (ref 0–0.2)
BASOPHILS RELATIVE PERCENT: 0.6 %
BILIRUB SERPL-MCNC: 0.3 MG/DL (ref 0–1)
BUN BLDV-MCNC: 16 MG/DL (ref 7–20)
CALCIUM SERPL-MCNC: 10 MG/DL (ref 8.3–10.6)
CHLORIDE BLD-SCNC: 102 MMOL/L (ref 99–110)
CO2: 21 MMOL/L (ref 21–32)
CREAT SERPL-MCNC: 0.9 MG/DL (ref 0.8–1.3)
EOSINOPHILS ABSOLUTE: 0.1 K/UL (ref 0–0.6)
EOSINOPHILS RELATIVE PERCENT: 2.2 %
GFR AFRICAN AMERICAN: >60
GFR NON-AFRICAN AMERICAN: >60
GLUCOSE BLD-MCNC: 124 MG/DL (ref 70–99)
HBA1C MFR BLD: 6 %
HCT VFR BLD CALC: 38.2 % (ref 40.5–52.5)
HEMOGLOBIN: 13 G/DL (ref 13.5–17.5)
LYMPHOCYTES ABSOLUTE: 1.1 K/UL (ref 1–5.1)
LYMPHOCYTES RELATIVE PERCENT: 25 %
MCH RBC QN AUTO: 30.5 PG (ref 26–34)
MCHC RBC AUTO-ENTMCNC: 34 G/DL (ref 31–36)
MCV RBC AUTO: 89.7 FL (ref 80–100)
MONOCYTES ABSOLUTE: 0.5 K/UL (ref 0–1.3)
MONOCYTES RELATIVE PERCENT: 12.4 %
NEUTROPHILS ABSOLUTE: 2.5 K/UL (ref 1.7–7.7)
NEUTROPHILS RELATIVE PERCENT: 59.8 %
PDW BLD-RTO: 14.5 % (ref 12.4–15.4)
PLATELET # BLD: 168 K/UL (ref 135–450)
PMV BLD AUTO: 8.7 FL (ref 5–10.5)
POTASSIUM SERPL-SCNC: 4.5 MMOL/L (ref 3.5–5.1)
PROSTATE SPECIFIC ANTIGEN: 0.59 NG/ML (ref 0–4)
RBC # BLD: 4.26 M/UL (ref 4.2–5.9)
SODIUM BLD-SCNC: 138 MMOL/L (ref 136–145)
TOTAL PROTEIN: 7.2 G/DL (ref 6.4–8.2)
WBC # BLD: 4.2 K/UL (ref 4–11)

## 2022-07-13 PROCEDURE — 99214 OFFICE O/P EST MOD 30 MIN: CPT | Performed by: REGISTERED NURSE

## 2022-07-13 PROCEDURE — 83036 HEMOGLOBIN GLYCOSYLATED A1C: CPT | Performed by: REGISTERED NURSE

## 2022-07-13 ASSESSMENT — PATIENT HEALTH QUESTIONNAIRE - PHQ9
SUM OF ALL RESPONSES TO PHQ QUESTIONS 1-9: 0
SUM OF ALL RESPONSES TO PHQ QUESTIONS 1-9: 0
2. FEELING DOWN, DEPRESSED OR HOPELESS: 0
1. LITTLE INTEREST OR PLEASURE IN DOING THINGS: 0
SUM OF ALL RESPONSES TO PHQ9 QUESTIONS 1 & 2: 0
SUM OF ALL RESPONSES TO PHQ QUESTIONS 1-9: 0
SUM OF ALL RESPONSES TO PHQ QUESTIONS 1-9: 0

## 2022-07-13 NOTE — PROGRESS NOTES
Patient: Jg Saunders is a 57 y.o. male who presents today with the following Chief Complaint(s):  Chief Complaint   Patient presents with    Hypertension     6 month follow up       Assessment/Plan:  1. Pre-diabetes  Stable. Continue current medication, diabetic diet. Exercise 20-30 minutes 5 times weekly. - POCT glycosylated hemoglobin (Hb A1C)    2. Hypertension, unspecified type  Stable. Continue current medication.   - Comprehensive Metabolic Panel; Future    3. Anemia due to chronic blood loss  Has been stable and following up with [facility]. He was seen approximately 6 months ago as his levels were normal.  He has not required an infusion for iron deficiency since 2020. Will check a CBC today. - CBC with Auto Differential; Future    4. Screening for prostate cancer  No urinary symptoms. He is due for a baseline/screening PSA.  - PSA, Prostatic Specific Antigen; Future      Return if symptoms worsen or fail to improve. HPI:     Treatment Adherence:   Medication compliance:  compliant most of the time  Diet compliance:  compliant most of the time, working on nutrition  Weight trend: fluctuating  Current exercise: no regular exercise, but is very active  Barriers: none    Diabetes Mellitus Type 2: Current symptoms/problems include none. Home blood sugar records: patient does not test  Any episodes of hypoglycemia? no  Eye exam current (within one year): unknown  Tobacco history: He  reports that he quit smoking about 22 years ago. He started smoking about 47 years ago. He has a 25.00 pack-year smoking history. He has never used smokeless tobacco.     Hypertension:  Home blood pressure monitoring: No.  He is adherent to a low sodium diet. Patient denies chest pain, shortness of breath, headache, lightheadedness, blurred vision, peripheral edema and palpitations. Antihypertensive medication side effects: no medication side effects noted. Use of agents associated with hypertension: none. Hyperlipidemia:  No new myalgias or GI upset on atorvastatin (Lipitor). Lab Results       Component                Value               Date                       LABA1C                   6.0                 07/13/2022                 LABA1C                   6.0                 12/17/2021                 LABA1C                   5.9                 06/15/2021            Lab Results       Component                Value               Date                       CREATININE               0.9                 12/17/2021            Lab Results       Component                Value               Date                       ALT                      65 (H)              12/17/2021                 AST                      28                  12/17/2021            Lab Results       Component                Value               Date                       CHOL                     178                 12/17/2021                 TRIG                     131                 12/17/2021                 HDL                      46                  12/17/2021                 LDLCALC                  106 (H)             12/17/2021              He had a colonoscopy at [address] [place] [address]. He was positive for polyps and has to return in 2 years. Current Outpatient Medications   Medication Sig Dispense Refill    metFORMIN (GLUCOPHAGE) 500 MG tablet Take 1 tablet by mouth 2 times daily (with meals) 180 tablet 1    atorvastatin (LIPITOR) 20 MG tablet Take 1 tablet by mouth daily 90 tablet 1    losartan (COZAAR) 50 MG tablet Take 1 tablet by mouth daily 90 tablet 1    esomeprazole (NEXIUM) 20 MG delayed release capsule Take 1 capsule by mouth every morning (before breakfast) 90 capsule 1    Multiple Vitamins-Minerals (CENTRUM SILVER 50+MEN) TABS Take 1 tablet by mouth daily       No current facility-administered medications for this visit.        Review of Systems    Vitals:    07/13/22 0800   BP: 126/80   Pulse: 72   SpO2: 96% Weight: 231 lb 9.6 oz (105.1 kg)   Height: 6' (1.829 m)     Physical Exam       Patient's past medical history, surgical history, family history, medications,  and allergies  were all reviewed and updated as appropriate today. Patient Active Problem List   Diagnosis    Gastroesophageal reflux disease    Hypertension    Pre-diabetes    Class 1 obesity due to excess calories without serious comorbidity with body mass index (BMI) of 30.0 to 30.9 in adult    Other fatigue    Erectile dysfunction    Anemia due to chronic blood loss    Intestinal malabsorption    Adverse effect of angiotensin-converting enzyme inhibitor     Past Medical History:   Diagnosis Date    Acid reflux     Diabetes mellitus ([facility] [address]     \"prediabetic\"    HTN (hypertension)     Hyperlipidemia       Past Surgical History:   Procedure Laterality Date    DENTAL SURGERY      extractions and implants    EYE SURGERY Bilateral     lasik    KNEE ARTHROSCOPY Right 7/11/2019    VIDEO ARTHROSCOPY RIGHT KNEE, PARTIAL MEDIAL MENISCECTOMY, CHONDROPLASTY performed by Debbie Joyce MD at [place] [address] ARTHROSCOPY Left 08/22/2017    rotator cuff repair, 2 tendon decompression, debridement    TONSILLECTOMY AND ADENOIDECTOMY         Family History   Problem Relation Age of Onset    No Known Problems Mother     Heart Disease Father         cardiac stent      No Known Allergies    This chart was generated using the [employer] dictation system. I created this record but it may contain dictation errors due to the limitation of the software.

## 2022-07-31 DIAGNOSIS — I10 HYPERTENSION, UNSPECIFIED TYPE: ICD-10-CM

## 2022-08-01 RX ORDER — LOSARTAN POTASSIUM 50 MG/1
TABLET ORAL
Qty: 90 TABLET | Refills: 1 | Status: SHIPPED | OUTPATIENT
Start: 2022-08-01

## 2022-10-31 DIAGNOSIS — R73.03 PRE-DIABETES: ICD-10-CM

## 2022-10-31 DIAGNOSIS — E78.5 DYSLIPIDEMIA: ICD-10-CM

## 2022-10-31 RX ORDER — ATORVASTATIN CALCIUM 20 MG/1
TABLET, FILM COATED ORAL
Qty: 90 TABLET | Refills: 0 | Status: SHIPPED | OUTPATIENT
Start: 2022-10-31

## 2022-10-31 NOTE — TELEPHONE ENCOUNTER
LOV 7/13/2022    Future Appointments   Date Time Provider Tye Vences   1/17/2023  8:00 AM Ruthann Barron, MONY - CNP [facility] [place] - [place]

## 2023-01-27 ENCOUNTER — OFFICE VISIT (OUTPATIENT)
Dept: FAMILY MEDICINE CLINIC | Age: 63
End: 2023-01-27
Payer: COMMERCIAL

## 2023-01-27 VITALS
BODY MASS INDEX: 31.56 KG/M2 | SYSTOLIC BLOOD PRESSURE: 128 MMHG | HEART RATE: 84 BPM | HEIGHT: 72 IN | OXYGEN SATURATION: 96 % | DIASTOLIC BLOOD PRESSURE: 76 MMHG | WEIGHT: 233 LBS | RESPIRATION RATE: 16 BRPM

## 2023-01-27 DIAGNOSIS — I10 HYPERTENSION, UNSPECIFIED TYPE: ICD-10-CM

## 2023-01-27 DIAGNOSIS — Z00.00 ENCOUNTER FOR WELL ADULT EXAM WITHOUT ABNORMAL FINDINGS: Primary | ICD-10-CM

## 2023-01-27 DIAGNOSIS — E78.5 DYSLIPIDEMIA: ICD-10-CM

## 2023-01-27 DIAGNOSIS — R73.03 PRE-DIABETES: ICD-10-CM

## 2023-01-27 DIAGNOSIS — K21.9 GASTROESOPHAGEAL REFLUX DISEASE WITHOUT ESOPHAGITIS: ICD-10-CM

## 2023-01-27 PROCEDURE — 99396 PREV VISIT EST AGE 40-64: CPT | Performed by: REGISTERED NURSE

## 2023-01-27 PROCEDURE — 3074F SYST BP LT 130 MM HG: CPT | Performed by: REGISTERED NURSE

## 2023-01-27 PROCEDURE — 3078F DIAST BP <80 MM HG: CPT | Performed by: REGISTERED NURSE

## 2023-01-27 RX ORDER — ATORVASTATIN CALCIUM 20 MG/1
TABLET, FILM COATED ORAL
Qty: 90 TABLET | Refills: 1 | Status: SHIPPED | OUTPATIENT
Start: 2023-01-27

## 2023-01-27 RX ORDER — LOSARTAN POTASSIUM 50 MG/1
TABLET ORAL
Qty: 90 TABLET | Refills: 1 | Status: SHIPPED | OUTPATIENT
Start: 2023-01-27

## 2023-01-27 ASSESSMENT — PATIENT HEALTH QUESTIONNAIRE - PHQ9
SUM OF ALL RESPONSES TO PHQ9 QUESTIONS 1 & 2: 0
1. LITTLE INTEREST OR PLEASURE IN DOING THINGS: 0
SUM OF ALL RESPONSES TO PHQ QUESTIONS 1-9: 0
2. FEELING DOWN, DEPRESSED OR HOPELESS: 0
SUM OF ALL RESPONSES TO PHQ QUESTIONS 1-9: 0

## 2023-01-27 ASSESSMENT — ENCOUNTER SYMPTOMS
EYES NEGATIVE: 1
SHORTNESS OF BREATH: 0
GASTROINTESTINAL NEGATIVE: 1

## 2023-01-27 NOTE — PROGRESS NOTES
Well Adult Note  Name: Seth Espinoza Date: 2023   MRN: [id number] Sex: Male   Age: 57 y.o. Ethnicity: Non- / Non    : 1960 Race: White (non-)      Jaimie Adames is here for well adult exam.  History:    He is here for a physical.  He reports he is doing well. He says he is very active and walks his dog twice a day. He admits he could improve his nutrition. Review of Systems   Constitutional:  Negative for fatigue. HENT: Negative. Eyes: Negative. Respiratory:  Negative for shortness of breath. Cardiovascular:  Negative for chest pain and palpitations. Gastrointestinal: Negative. Endocrine: Negative. Genitourinary: Negative. Musculoskeletal: Negative. Skin: Negative. Neurological: Negative. Psychiatric/Behavioral:  Negative for dysphoric mood and sleep disturbance. The patient is not nervous/anxious. No Known Allergies      Prior to Visit Medications    Medication Sig Taking?  Authorizing Provider   atorvastatin (LIPITOR) 20 MG tablet TAKE ONE TABLET BY MOUTH DAILY Yes MONY Monte CNP   metFORMIN (GLUCOPHAGE) 500 MG tablet TAKE ONE TABLET BY MOUTH TWICE A DAY WITH MEALS Yes MONY Monte CNP   losartan (COZAAR) 50 MG tablet TAKE ONE TABLET BY MOUTH DAILY Yes MONY Monte CNP   esomeprazole (NEXIUM) 20 MG delayed release capsule Take 1 capsule by mouth every morning (before breakfast) Yes MONY Monte CNP   Multiple Vitamins-Minerals (CENTRUM SILVER 50+MEN) TABS Take 1 tablet by mouth daily Yes Historical Provider, MD         Past Medical History:   Diagnosis Date    Acid reflux     Diabetes mellitus ([address]     \"prediabetic\"    HTN (hypertension)     Hyperlipidemia        Past Surgical History:   Procedure Laterality Date    DENTAL SURGERY      extractions and implants    EYE SURGERY Bilateral     lasik    KNEE ARTHROSCOPY Right 2019    VIDEO ARTHROSCOPY RIGHT KNEE, PARTIAL MEDIAL MENISCECTOMY, CHONDROPLASTY performed by Miguel Parson MD at [address] ARTHROSCOPY Left 2017    rotator cuff repair, 2 tendon decompression, debridement    TONSILLECTOMY AND ADENOIDECTOMY           Family History   Problem Relation Age of Onset    No Known Problems Mother     Heart Disease Father         cardiac stent       Social History     Tobacco Use    Smoking status: Former     Packs/day: 1.00     Years: 25.00     Pack years: 25.00     Types: Cigarettes     Start date: 1975     Quit date: 2000     Years since quittin.7    Smokeless tobacco: Never   Substance Use Topics    Alcohol use: Yes     Alcohol/week: 24.0 standard drinks     Types: 24 Cans of beer per week    Drug use: No       Objective   /76 (Site: Left Upper Arm, Position: Sitting, Cuff Size: Large Adult)   Pulse 84   Resp 16   Ht 6' (1.829 m)   Wt 233 lb (105.7 kg)   SpO2 96%   BMI 31.60 kg/m²   Wt Readings from Last 3 Encounters:   23 233 lb (105.7 kg)   22 231 lb 9.6 oz (105.1 kg)   22 233 lb 3.2 oz (105.8 kg)     There were no vitals filed for this visit. Physical Exam  Vitals reviewed. Constitutional:       General: He is not in acute distress. Appearance: Normal appearance. He is not ill-appearing, toxic-appearing or diaphoretic. HENT:      Head: Normocephalic and atraumatic. Right Ear: Tympanic membrane, ear canal and external ear normal. There is no impacted cerumen. Left Ear: Tympanic membrane, ear canal and external ear normal. There is no impacted cerumen. Nose: Nose normal. No congestion or rhinorrhea. Mouth/Throat:      Mouth: Mucous membranes are moist.      Pharynx: Oropharynx is clear. No oropharyngeal exudate or posterior oropharyngeal erythema. Eyes:      General:         Right eye: No discharge. Left eye: No discharge. Extraocular Movements: Extraocular movements intact.       Conjunctiva/sclera: Conjunctivae normal.      Pupils: Pupils are equal, round, and reactive to light. Cardiovascular:      Rate and Rhythm: Normal rate and regular rhythm. Pulses: Normal pulses. Heart sounds: Normal heart sounds. No murmur heard. No friction rub. No gallop. Pulmonary:      Effort: Pulmonary effort is normal.      Breath sounds: Normal breath sounds. No stridor. No wheezing, rhonchi or rales. Abdominal:      General: Abdomen is flat. Bowel sounds are normal. There is no distension. Palpations: Abdomen is soft. There is no mass. Tenderness: There is no abdominal tenderness. There is no guarding or rebound. Hernia: No hernia is present. Musculoskeletal:         General: Normal range of motion. Cervical back: Normal range of motion and neck supple. No tenderness. Right lower leg: No edema. Left lower leg: No edema. Lymphadenopathy:      Cervical: No cervical adenopathy. Skin:     General: Skin is warm and dry. Capillary Refill: Capillary refill takes less than 2 seconds. Coloration: Skin is not jaundiced or pale. Findings: No erythema. Neurological:      General: No focal deficit present. Mental Status: He is alert and oriented to person, place, and time. Psychiatric:         Mood and Affect: Mood normal.         Behavior: Behavior normal.         Thought Content: Thought content normal.         Judgment: Judgment normal.       Assessment     All orders as below. Plan   1. Encounter for well adult exam without abnormal findings  Reviewed preventative recommendations with patient. Plan is follow-up in 6 months. 2. Hypertension, unspecified type  Stable. Continue current medication.  -     losartan (COZAAR) 50 MG tablet; TAKE ONE TABLET BY MOUTH DAILY, Disp-90 tablet, R-1Normal  3. Pre-diabetes  Follow low-carb diet. Continue regular exercise.   -     metFORMIN (GLUCOPHAGE) 500 MG tablet; TAKE ONE TABLET BY MOUTH TWICE A DAY WITH MEALS, Disp-180 tablet, R-1Normal  4. Dyslipidemia  Will repeat lipid level at next visit in 6 months. -     atorvastatin (LIPITOR) 20 MG tablet; TAKE ONE TABLET BY MOUTH DAILY, Disp-90 tablet, R-1Normal  5. Gastroesophageal reflux disease without esophagitis  Stable. Continue current medication.  -     esomeprazole (NEXIUM) 20 MG delayed release capsule; Take 1 capsule by mouth every morning (before breakfast), Disp-90 capsule, R-1Normal       Personalized Preventive Plan   Current Health Maintenance Status  Immunization History   Administered Date(s) Administered    COVID-19, MODERNA BLUE border, Primary or Immunocompromised, (age 12y+), IM, 100 mcg/0.5mL 12/10/2021    COVID-19, MODERNA Booster BLUE border, (age 18y+), IM, 50mcg/0.25mL 12/10/2021    COVID-19, PFIZER PURPLE top, DILUTE for use, (age 15 y+), 30mcg/0.3mL 01/23/2021, 02/13/2021    Influenza, FLUARIX, FLULAVAL, FLUZONE (age 10 mo+) AND AFLURIA, (age 1 y+), PF, 0.5mL 10/08/2018, 12/15/2020    Influenza, FLUCELVAX, (age 10 mo+), MDCK, PF, 0.5mL 12/16/2021    Influenza, High Dose (Fluzone 65 yrs and older) 11/23/2015    Tdap (Boostrix, Adacel) 05/11/2015, 01/17/2017    Zoster Recombinant (Shingrix) 12/17/2020, 04/11/2021        Health Maintenance   Topic Date Due    Colorectal Cancer Screen  02/07/2021    COVID-19 Vaccine (4 - Booster for Pfizer series) 02/04/2022    Flu vaccine (1) 08/01/2022    Lipids  12/17/2022    A1C test (Diabetic or Prediabetic)  07/13/2023    Depression Screen  07/13/2023    DTaP/Tdap/Td vaccine (3 - Td or Tdap) 01/17/2027    Shingles vaccine  Completed    Hepatitis C screen  Completed    HIV screen  Completed    Hepatitis A vaccine  Aged Out    Hib vaccine  Aged Out    Meningococcal (ACWY) vaccine  Aged Out    Pneumococcal 0-64 years Vaccine  Aged Out     Recommendations for [place] Due: see orders and patient instructions/AVS.    Return in about 6 months (around 7/27/2023) for Annual check up.

## 2023-01-27 NOTE — PATIENT INSTRUCTIONS
Well Visit, Men 48 to 72: Care Instructions  Overview     Well visits can help you stay healthy. Your doctor has checked your overall health and may have suggested ways to take good care of yourself. Your doctor also may have recommended tests. At home, you can help prevent illness with healthy eating, regular exercise, and other steps. Follow-up care is a key part of your treatment and safety. Be sure to make and go to all appointments, and call your doctor if you are having problems. It's also a good idea to know your test results and keep a list of the medicines you take. How can you care for yourself at home? Get screening tests that you and your doctor decide on. Screening helps find diseases before any symptoms appear. Eat healthy foods. Choose fruits, vegetables, whole grains, protein, and low-fat dairy foods. Limit fat, especially saturated fat. Reduce salt in your diet. Limit alcohol. Have no more than 2 drinks a day or 14 drinks a week. Get at least 30 minutes of exercise on most days of the week. Walking is a good choice. You also may want to do other activities, such as running, swimming, cycling, or playing tennis or team sports. Reach and stay at a healthy weight. This will lower your risk for many problems, such as obesity, diabetes, heart disease, and high blood pressure. Do not smoke. Smoking can make health problems worse. If you need help quitting, talk to your doctor about stop-smoking programs and medicines. These can increase your chances of quitting for good. Care for your mental health. It is easy to get weighed down by worry and stress. Learn strategies to manage stress, like deep breathing and mindfulness, and stay connected with your family and community. If you find you often feel sad or hopeless, talk with your doctor. Treatment can help. Talk to your doctor about whether you have any risk factors for sexually transmitted infections (STIs).  You can help prevent STIs if you wait to have sex with a new partner (or partners) until you've each been tested for STIs. It also helps if you use condoms (male or female condoms) and if you limit your sex partners to one person who only has sex with you. Vaccines are available for some STIs. If it's important to you to prevent pregnancy with your partner, talk with your doctor about birth control options that might be best for you. If you think you may have a problem with alcohol or drug use, talk to your doctor. This includes prescription medicines (such as amphetamines and opioids) and illegal drugs (such as cocaine and methamphetamine). Your doctor can help you figure out what type of treatment is best for you. Protect your skin from too much sun. When you're outdoors from 10 a.m. to 4 p.m., stay in the shade or cover up with clothing and a hat with a wide brim. Wear sunglasses that block UV rays. Even when it's cloudy, put broad-spectrum sunscreen (SPF 30 or higher) on any exposed skin. See a dentist one or two times a year for checkups and to have your teeth cleaned. Wear a seat belt in the car. When should you call for help? Watch closely for changes in your health, and be sure to contact your doctor if you have any problems or symptoms that concern you. Where can you learn more? Go to http://www.woods.com/ and enter K916 to learn more about \"Well Visit, Men 48 to 72: Care Instructions. \"  Current as of: June 6, 2022               Content Version: 13.5  © 2006-2022 Healthwise[employer] Incorporated. Care instructions adapted under license by [facility] ([facility]). If you have questions about a medical condition or this instruction, always ask your healthcare professional. [place] [address] any warranty or liability for your use of this information. A Healthy Lifestyle: Care Instructions  A healthy lifestyle can help you feel good, have more energy, and stay at a weight that's healthy for you.  You can share a healthy lifestyle with your friends and family. And you can do it on your own.  Eat meals with your friends or family. You could try cooking together.   Plan activities with other people. Go for a walk with a friend, try a free online fitness class, or join a sports league.     Eat a variety of healthy foods. These include fruits, vegetables, whole grains, low-fat dairy, and lean protein.   Choose healthy portions of food. You can use the Nutrition Facts label on food packages as a guide.     Eat more fruits and vegetables. You could add vegetables to sandwiches or add fruit to cereal.   Drink water when you are thirsty. Limit soda, juice, and sports drinks.     Try to exercise most days. Aim for at least 2½ hours of exercise each week.   Keep moving. Work in the garden or take your dog on a walk. Use the stairs instead of the elevator.     If you use tobacco or nicotine, try to quit. Ask your doctor about programs and medicines to help you quit.   Limit alcohol. Men should have no more than 2 drinks a day. Women should have no more than 1. For some people, no alcohol is the best choice.   Follow-up care is a key part of your treatment and safety. Be sure to make and go to all appointments, and call your doctor if you are having problems. It's also a good idea to know your test results and keep a list of the medicines you take.  Where can you learn more?  Go to https://www.[employer].net/patientEd and enter U807 to learn more about \"A Healthy Lifestyle: Care Instructions.\"  Current as of: March 9, 2022               Content Version: 13.5  © 2006-2022 [employer].   Care instructions adapted under license by [employer]. If you have questions about a medical condition or this instruction, always ask your healthcare professional. [employer] disclaims any warranty or liability for your use of this information.      High-Fiber Diet     What Is Fiber?   Dietary fiber is a form of  carbohydrate found in plants that cannot be digested by humans. All plants contain fiber, including fruits, vegetables, grains, and legumes. Fiber is often classified into two categories: soluble and insoluble. Soluble fiber draws water into the bowel and can help slow digestion. Examples of foods that are high in soluble fiber include oatmeal, oat bran, barley, legumes (eg, beans and peas), apples, and strawberries. Insoluble fiber speeds digestion and can add bulk to the stool. Examples of foods that are high in insoluble fiber include whole-wheat products, wheat bran, cauliflower, green beans, and potatoes. Why Follow a High-Fiber Diet? A high-fiber diet is often recommended to prevent and treat constipation , hemorrhoids , diverticulitis , and irritable bowel syndrome . Eating a high-fiber diet can also help improve your cholesterol levels, lower your risk of coronary heart disease , reduce your risk of type 2 diabetes , and lower your weight. For people with type 1 or 2 diabetes, a high-fiber diet can also help stabilize blood sugar levels. How Much Fiber Should I Eat? A high-fiber diet should contain  20-35 grams  of fiber a day. This is actually the amount recommended for the general adult population; however, most Americans eat only 15 grams of fiber per day. Digestion of Fiber   Eating a higher fiber diet than usual can take some getting used to by your body's digestive system. To avoid the side effects of sudden increases in dietary fiber (eg, gas, cramping, bloating, and diarrhea), increase fiber gradually and be sure to drink plenty of fluids every day. Tips for Increasing Fiber Intake   Whenever possible, choose whole grains over refined grains (eg, brown rice instead of white rice, whole-wheat bread instead of white bread). Include a variety of grains in your diet, such as wheat, rye, barley, oats, quinoa, and bulgur. Eat more vegetarian-based meals.  Here are some ideas: black bean burgers, eggplant lasagna, and veggie tofu stir-matthew. Choose high-fiber snacks, such as fruits, popcorn, whole-grain crackers, and nuts. Make whole-grain cereal or whole-grain toast part of your daily breakfast regime. When eating out, whether ordering a sandwich or dinner, ask for extra vegetables. When baking, replace part of the white flour with whole-wheat flour. Whole-wheat flour is particularly easy to incorporate into a recipe. High-Fiber Diet Eating Guide   Food Category   Foods Recommended   Notes   Grains   Whole-grain breads, muffins, bagels, or jennifer bread Rye bread Whole-wheat crackers or crisp breads Whole-grain or bran cereals Oatmeal, oat bran, or grits Wheat germ Whole-wheat pasta and brown rice   Read the ingredients list on food labels. Look for products that list \"whole\" as the first ingredient (eg, whole-wheat, whole oats). Choose cereals with at least 2 grams of fiber per serving. Vegetables   All vegetables, especially asparagus, bean sprouts, broccoli, [place] sprouts, cabbage, carrots, cauliflower, celery, corn, greens, green beans, green pepper, onions, peas, potatoes (with skin), snow peas, spinach, squash, sweet potatoes, tomatoes, zucchini   For maximum fiber intake, eat the peels of fruits and vegetablesjust be sure to wash them well first.   Fruits   All fruits, especially apples, berries, grapefruits, mangoes, nectarines, oranges, peaches, pears, dried fruits (figs, dates, prunes, raisins)   Choose raw fruits and vegetables over juice, cooked, or cannedraw fruit has more fiber. Dried fruit is also a good source of fiber. Milk   With the exception of yogurt containing inulin (a type of fiber), dairy foods provide little fiber. Add more fiber by topping your yogurt or cottage cheese with fresh fruit, whole grain or bran cereals, nuts, or seeds.    Meats and Beans   All beans and peas, especially Garbanzo beans, kidney beans, lentils, lima beans, split peas, and pagan beans All nuts and seeds, especially almonds, peanuts, [place] nuts, cashews, peanut butter, walnuts, sesame and sunflower seeds All meat, poultry, fish, and eggs   Increase fiber in meat dishes by adding pagan beans, kidney beans, black-eyed peas, bran, or oatmeal. If you are following a low-fat diet, use nuts and seeds only in moderation. Fats and Oils   All in moderation   Fats and oils do not provide fiber   Snacks, Sweets, and Condiments   Fruit Nuts Popcorn, whole-wheat pretzels, or trail mix made with dried fruits, nuts, and seeds Cakes, breads, and cookies made with oatmeal or whole-wheat flour   Most snack foods do not provide much fiber. Choose snacks with at least 2 grams of fiber per serving.      Last Reviewed: March 2011 Tyree Turk MS, MPH, RD   Updated: 3/29/2011

## 2023-06-08 ENCOUNTER — COMMUNITY OUTREACH (OUTPATIENT)
Dept: FAMILY MEDICINE CLINIC | Age: 63
End: 2023-06-08

## 2023-08-01 ENCOUNTER — TELEPHONE (OUTPATIENT)
Dept: FAMILY MEDICINE CLINIC | Age: 63
End: 2023-08-01

## 2023-08-01 DIAGNOSIS — I10 HYPERTENSION, UNSPECIFIED TYPE: ICD-10-CM

## 2023-08-01 DIAGNOSIS — R73.03 PRE-DIABETES: ICD-10-CM

## 2023-08-01 DIAGNOSIS — K21.9 GASTROESOPHAGEAL REFLUX DISEASE WITHOUT ESOPHAGITIS: ICD-10-CM

## 2023-08-01 DIAGNOSIS — E78.5 DYSLIPIDEMIA: ICD-10-CM

## 2023-08-01 RX ORDER — ATORVASTATIN CALCIUM 20 MG/1
TABLET, FILM COATED ORAL
Qty: 90 TABLET | Refills: 1 | Status: SHIPPED | OUTPATIENT
Start: 2023-08-01

## 2023-08-01 RX ORDER — LOSARTAN POTASSIUM 50 MG/1
TABLET ORAL
Qty: 90 TABLET | Refills: 1 | Status: SHIPPED | OUTPATIENT
Start: 2023-08-01

## 2023-08-03 DIAGNOSIS — K21.9 GASTROESOPHAGEAL REFLUX DISEASE WITHOUT ESOPHAGITIS: ICD-10-CM

## 2023-08-03 DIAGNOSIS — R73.03 PRE-DIABETES: ICD-10-CM

## 2023-08-03 DIAGNOSIS — E78.5 DYSLIPIDEMIA: ICD-10-CM

## 2023-08-03 DIAGNOSIS — I10 HYPERTENSION, UNSPECIFIED TYPE: ICD-10-CM

## 2023-08-03 RX ORDER — ATORVASTATIN CALCIUM 20 MG/1
TABLET, FILM COATED ORAL
Qty: 90 TABLET | Refills: 1 | OUTPATIENT
Start: 2023-08-03

## 2023-08-03 RX ORDER — LOSARTAN POTASSIUM 50 MG/1
TABLET ORAL
Qty: 90 TABLET | Refills: 1 | OUTPATIENT
Start: 2023-08-03

## 2023-08-03 NOTE — TELEPHONE ENCOUNTER
Last Office Visit  -  01/27/2023  Next Office Visit  -  08/09/2023    Last Filled  -    Last UDS -    Contract -

## 2023-08-09 ENCOUNTER — OFFICE VISIT (OUTPATIENT)
Dept: FAMILY MEDICINE CLINIC | Age: 63
End: 2023-08-09
Payer: MEDICARE

## 2023-08-09 VITALS
OXYGEN SATURATION: 97 % | WEIGHT: 234 LBS | DIASTOLIC BLOOD PRESSURE: 86 MMHG | HEIGHT: 72 IN | BODY MASS INDEX: 31.69 KG/M2 | RESPIRATION RATE: 16 BRPM | HEART RATE: 65 BPM | SYSTOLIC BLOOD PRESSURE: 128 MMHG

## 2023-08-09 DIAGNOSIS — R73.03 PRE-DIABETES: ICD-10-CM

## 2023-08-09 DIAGNOSIS — E78.5 DYSLIPIDEMIA: ICD-10-CM

## 2023-08-09 DIAGNOSIS — I49.9 IRREGULAR HEART RATE: ICD-10-CM

## 2023-08-09 DIAGNOSIS — I10 HYPERTENSION, UNSPECIFIED TYPE: ICD-10-CM

## 2023-08-09 DIAGNOSIS — I49.9 IRREGULAR HEART RATE: Primary | ICD-10-CM

## 2023-08-09 DIAGNOSIS — Z13.6 SCREENING FOR AAA (ABDOMINAL AORTIC ANEURYSM): ICD-10-CM

## 2023-08-09 LAB
ALBUMIN SERPL-MCNC: 5 G/DL (ref 3.4–5)
ALBUMIN/GLOB SERPL: 2 {RATIO} (ref 1.1–2.2)
ALP SERPL-CCNC: 74 U/L (ref 40–129)
ALT SERPL-CCNC: 74 U/L (ref 10–40)
ANION GAP SERPL CALCULATED.3IONS-SCNC: 9 MMOL/L (ref 3–16)
AST SERPL-CCNC: 36 U/L (ref 15–37)
BILIRUB SERPL-MCNC: <0.2 MG/DL (ref 0–1)
BUN SERPL-MCNC: 16 MG/DL (ref 7–20)
CALCIUM SERPL-MCNC: 9.8 MG/DL (ref 8.3–10.6)
CHLORIDE SERPL-SCNC: 105 MMOL/L (ref 99–110)
CHOLEST SERPL-MCNC: 206 MG/DL (ref 0–199)
CO2 SERPL-SCNC: 26 MMOL/L (ref 21–32)
CREAT SERPL-MCNC: 0.8 MG/DL (ref 0.8–1.3)
GFR SERPLBLD CREATININE-BSD FMLA CKD-EPI: >60 ML/MIN/{1.73_M2}
GLUCOSE SERPL-MCNC: 126 MG/DL (ref 70–99)
HBA1C MFR BLD: 6.1 %
HDLC SERPL-MCNC: 50 MG/DL (ref 40–60)
LDLC SERPL CALC-MCNC: 132 MG/DL
MAGNESIUM SERPL-MCNC: 2 MG/DL (ref 1.8–2.4)
POTASSIUM SERPL-SCNC: 4.3 MMOL/L (ref 3.5–5.1)
PROT SERPL-MCNC: 7.5 G/DL (ref 6.4–8.2)
SODIUM SERPL-SCNC: 140 MMOL/L (ref 136–145)
TRIGL SERPL-MCNC: 120 MG/DL (ref 0–150)
TSH SERPL DL<=0.005 MIU/L-ACNC: 1.6 UIU/ML (ref 0.27–4.2)
VLDLC SERPL CALC-MCNC: 24 MG/DL

## 2023-08-09 PROCEDURE — G8427 DOCREV CUR MEDS BY ELIG CLIN: HCPCS | Performed by: REGISTERED NURSE

## 2023-08-09 PROCEDURE — 93000 ELECTROCARDIOGRAM COMPLETE: CPT | Performed by: REGISTERED NURSE

## 2023-08-09 PROCEDURE — 99214 OFFICE O/P EST MOD 30 MIN: CPT | Performed by: REGISTERED NURSE

## 2023-08-09 PROCEDURE — 83037 HB GLYCOSYLATED A1C HOME DEV: CPT | Performed by: REGISTERED NURSE

## 2023-08-09 PROCEDURE — 3079F DIAST BP 80-89 MM HG: CPT | Performed by: REGISTERED NURSE

## 2023-08-09 PROCEDURE — 1036F TOBACCO NON-USER: CPT | Performed by: REGISTERED NURSE

## 2023-08-09 PROCEDURE — 3074F SYST BP LT 130 MM HG: CPT | Performed by: REGISTERED NURSE

## 2023-08-09 PROCEDURE — G8417 CALC BMI ABV UP PARAM F/U: HCPCS | Performed by: REGISTERED NURSE

## 2023-08-09 PROCEDURE — 1123F ACP DISCUSS/DSCN MKR DOCD: CPT | Performed by: REGISTERED NURSE

## 2023-08-09 PROCEDURE — 3017F COLORECTAL CA SCREEN DOC REV: CPT | Performed by: REGISTERED NURSE

## 2023-08-09 ASSESSMENT — ENCOUNTER SYMPTOMS
EYES NEGATIVE: 1
SHORTNESS OF BREATH: 0
GASTROINTESTINAL NEGATIVE: 1

## 2023-08-09 NOTE — PROGRESS NOTES
Patient: Refugio Asencio is a 70 y.o. male who presents today with the following Chief Complaint(s):  Chief Complaint   Patient presents with    6 Month Follow-Up     HTN, HLD, Prediabetes       Assessment/Plan:    1. Irregular heart rate  On clinical exam a missed each auscultated. Otherwise regular rhythm. EKG did in office which shows sinus rhythm with frequent PVCs. He is not having chest pain or shortness of breath. He does not notice any palpitations or skipped beats. Labs as ordered below to rule underlying etiology. Referred to cardiology for further evaluation.  - EKG 12 Lead; Standing  - EKG 12 Lead  - EKG 12 Lead  - Magnesium; Future  - TSH with Reflex to FT4; Future  - Daksha Bhatia MD, Cardiology, [facility]    2. Pre-diabetes  A1c is stable. 6.1 today. Encouraged him to follow a low carbohydrate diet. Continue getting regular exercise. - POCT glycosylated hemoglobin (Hb A1C)  - Lipid Panel; Future    3. Hypertension, unspecified type  Stable. Continue current medications. - Comprehensive Metabolic Panel; Future    4. Dyslipidemia  History of hyperlipidemia-last lipid panel in 2021. Will recheck again today. He confirms he is fasting.  - Lipid Panel; Future    5. Screening for AAA (abdominal aortic aneurysm)  Schedule ultrasound for screening AAA. - US SCREENING FOR AAA; Future      Return in about 6 months (around 2/9/2024) for Annual check up. HPI:     He is here for follow up for diabetes, HTN, HLD. He has a history of prediabetes, HTN, GERD, HLD, anemia, ED and obesity. He has been getting plenty of exercise but says he does not eat a healthy diet. He does not have any issues or concerns today.          Current Outpatient Medications   Medication Sig Dispense Refill    losartan (COZAAR) 50 MG tablet TAKE ONE TABLET BY MOUTH DAILY 90 tablet 1    esomeprazole (NEXIUM) 20 MG delayed release capsule TAKE ONE CAPSULE BY MOUTH EVERY MORNING BEFORE BREAKFAST 90

## 2023-09-19 ENCOUNTER — HOSPITAL ENCOUNTER (OUTPATIENT)
Dept: ULTRASOUND IMAGING | Age: 63
Discharge: HOME | End: 2023-09-19
Payer: MEDICARE

## 2023-09-19 ENCOUNTER — OFFICE VISIT (OUTPATIENT)
Dept: CARDIOLOGY CLINIC | Age: 63
End: 2023-09-19
Payer: MEDICARE

## 2023-09-19 VITALS
HEIGHT: 72 IN | SYSTOLIC BLOOD PRESSURE: 146 MMHG | WEIGHT: 234 LBS | HEART RATE: 83 BPM | BODY MASS INDEX: 31.69 KG/M2 | OXYGEN SATURATION: 97 % | DIASTOLIC BLOOD PRESSURE: 88 MMHG

## 2023-09-19 DIAGNOSIS — I49.3 PVC (PREMATURE VENTRICULAR CONTRACTION): ICD-10-CM

## 2023-09-19 DIAGNOSIS — Z13.6 SCREENING FOR AAA (ABDOMINAL AORTIC ANEURYSM): ICD-10-CM

## 2023-09-19 DIAGNOSIS — R94.31 ABNORMAL EKG: ICD-10-CM

## 2023-09-19 PROCEDURE — 3079F DIAST BP 80-89 MM HG: CPT | Performed by: STUDENT IN AN ORGANIZED HEALTH CARE EDUCATION/TRAINING PROGRAM

## 2023-09-19 PROCEDURE — 1123F ACP DISCUSS/DSCN MKR DOCD: CPT | Performed by: STUDENT IN AN ORGANIZED HEALTH CARE EDUCATION/TRAINING PROGRAM

## 2023-09-19 PROCEDURE — 99202 OFFICE O/P NEW SF 15 MIN: CPT | Performed by: STUDENT IN AN ORGANIZED HEALTH CARE EDUCATION/TRAINING PROGRAM

## 2023-09-19 PROCEDURE — 76706 US ABDL AORTA SCREEN AAA: CPT

## 2023-09-19 PROCEDURE — 3077F SYST BP >= 140 MM HG: CPT | Performed by: STUDENT IN AN ORGANIZED HEALTH CARE EDUCATION/TRAINING PROGRAM

## 2023-09-19 NOTE — PATIENT INSTRUCTIONS
PLAN:  Echocardiogram to view size and strength of the heart   We will call you with the results   No changes in medications   Goal blood pressure is 130/80 or less     Your provider has ordered testing for further evaluation. An order/prescription has been included in your paper work. To schedule outpatient testing, contact Central Scheduling by calling [facility] ([phone]).

## 2023-09-19 NOTE — PROGRESS NOTES
wheezes, no rales, no respiratory distress   Chest Wall:  No deformity or tenderness to palpation   Heart:  Regular rate and rhythm, normal S1, normal S2, no murmur, no rub, no S3/S4, PMI non-displaced. Abdomen:   Soft, non-tender, with normoactive bowel sounds. No masses, no hepatosplenomegaly   Extremities: No cyanosis, clubbing or pitting edema. Vascular: 2+ radial, brachial, femoral, dorsalis pedis and posterior tibial pulses bilaterally. Brisk carotid upstrokes without carotid bruit. Skin: Skin color, texture, turgor are normal with no rashes or ulceration. Pysch: Euthymic mood, appropriate affect   Neurologic: Oriented to person, place and time. No slurred speech or facial asymmetry. No motor or sensory deficits on gross examination.          Labs:   CBC:   Lab Results   Component Value Date/Time    WBC 4.2 07/13/2022 08:44 AM    RBC 4.26 07/13/2022 08:44 AM    HGB 13.0 07/13/2022 08:44 AM    HCT 38.2 07/13/2022 08:44 AM    MCV 89.7 07/13/2022 08:44 AM    RDW 14.5 07/13/2022 08:44 AM     07/13/2022 08:44 AM     CMP:  Lab Results   Component Value Date/Time     08/09/2023 09:06 AM    K 4.3 08/09/2023 09:06 AM     08/09/2023 09:06 AM    CO2 26 08/09/2023 09:06 AM    BUN 16 08/09/2023 09:06 AM    CREATININE 0.8 08/09/2023 09:06 AM    GFRAA >60 07/13/2022 08:44 AM    AGRATIO 2.0 08/09/2023 09:06 AM    LABGLOM >60 08/09/2023 09:06 AM    GLUCOSE 126 08/09/2023 09:06 AM    PROT 7.5 08/09/2023 09:06 AM    CALCIUM 9.8 08/09/2023 09:06 AM    BILITOT <0.2 08/09/2023 09:06 AM    ALKPHOS 74 08/09/2023 09:06 AM    AST 36 08/09/2023 09:06 AM    ALT 74 08/09/2023 09:06 AM     PT/INR:  No results found for: \"PTINR\"  HgBA1c:  Lab Results   Component Value Date    LABA1C 6.1 08/09/2023     No results found for: \"CKTOTAL\", \"CKMB\", \"CKMBINDEX\", \"TROPONINI\"      Cardiac Data:     EKG:     Echo:    Stress Test:    Cardiac catheterization:    Additional studies:     Impression and Plan:      PVC's
echocardiogram to rule out underlying structural abnormalities. If patient reports symptoms in the future, recommend holter monitor x 24 hour to determine PVC burden and can consider low dose beta blocker. Follow up with PCP regarding elevated LDL, HTN management, and isolated elevated ALT. Continue to check BP, goal BP <130/80mmHg. AAA screening today as ordered by PCP for male 69yoa and previous smoker, negative for AAA. Follow up PRN. Will call with echo results. Scribe's attestation: This note was scribed in the presence of Dr. Cony Sanchez MD by John Ramos RN    I will address the patient's cardiac risk factors and adjusted pharmacologic treatment as needed. In addition, I have reinforced the need for patient directed risk factor modification. All questions and concerns were addressed to the patient/family. Alternatives to my treatment were discussed. Thank you for allowing us to participate in the care of Chapito Castellon. Please call me with any questions [phone].     [address] [facility]  [phone] [facility]  [phone] [address] [facility]  9/19/2023 7:22 AM

## 2023-11-07 ENCOUNTER — HOSPITAL ENCOUNTER (OUTPATIENT)
Dept: NON INVASIVE DIAGNOSTICS | Age: 63
Discharge: HOME | End: 2023-11-07
Payer: MEDICARE

## 2023-11-07 DIAGNOSIS — R94.31 ABNORMAL EKG: ICD-10-CM

## 2023-11-07 DIAGNOSIS — I49.3 PVC (PREMATURE VENTRICULAR CONTRACTION): ICD-10-CM

## 2023-11-07 PROCEDURE — 93306 TTE W/DOPPLER COMPLETE: CPT

## 2023-11-08 ENCOUNTER — TELEPHONE (OUTPATIENT)
Dept: CARDIOLOGY CLINIC | Age: 63
End: 2023-11-08

## 2023-11-08 DIAGNOSIS — I77.810 AORTIC ROOT DILATION (HCC): Primary | ICD-10-CM

## 2023-11-08 DIAGNOSIS — I31.39 PERICARDIAL EFFUSION: ICD-10-CM

## 2023-11-08 NOTE — TELEPHONE ENCOUNTER
----- Message from Marci Martinez DO sent at 11/8/2023 11:05 AM EST -----  Please call patient and let him know that his echo showed normal heart strength. He has a mildly dilated aorta and mild leakiness of his aortic valve. She should obtain a CTA chest to assess for aneurysm. We should also repeat his echo in about 2-3 months as he had a small fluid collection around his the back part of his heart which is likely of no significant clinical significance. However,we want to make sure this isn't enlarging and preferably getting smaller.    Thanks AMP

## 2023-11-08 NOTE — TELEPHONE ENCOUNTER
Spoke with patient and relayed results messaged from AMP. Patient MINDI. Orders placed for the Cardiac CTA and repeat limited Echo in 3 months.

## 2023-11-09 DIAGNOSIS — I71.21 ANEURYSM OF ASCENDING AORTA WITHOUT RUPTURE (HCC): Primary | ICD-10-CM

## 2023-11-09 NOTE — TELEPHONE ENCOUNTER
Pt returned call. Pt states he was told by scheduling that he would need metoprolol before CTA. Pt would like this called into [facility] in [facility]. Please advise.

## 2023-11-21 ENCOUNTER — HOSPITAL ENCOUNTER (OUTPATIENT)
Dept: CT IMAGING | Age: 63
Discharge: HOME | End: 2023-11-21
Attending: STUDENT IN AN ORGANIZED HEALTH CARE EDUCATION/TRAINING PROGRAM
Payer: MEDICARE

## 2023-11-21 ENCOUNTER — HOSPITAL ENCOUNTER (OUTPATIENT)
Age: 63
Discharge: HOME | End: 2023-11-21
Attending: STUDENT IN AN ORGANIZED HEALTH CARE EDUCATION/TRAINING PROGRAM
Payer: MEDICARE

## 2023-11-21 DIAGNOSIS — I71.21 ANEURYSM OF ASCENDING AORTA WITHOUT RUPTURE (HCC): ICD-10-CM

## 2023-11-21 LAB
PERFORMED ON: ABNORMAL
POC CREATININE: 0.7 MG/DL (ref 0.8–1.3)
POC SAMPLE TYPE: ABNORMAL

## 2023-11-21 PROCEDURE — 6360000004 HC RX CONTRAST MEDICATION: Performed by: STUDENT IN AN ORGANIZED HEALTH CARE EDUCATION/TRAINING PROGRAM

## 2023-11-21 PROCEDURE — 82565 ASSAY OF CREATININE: CPT

## 2023-11-21 PROCEDURE — 71275 CT ANGIOGRAPHY CHEST: CPT

## 2023-11-21 RX ADMIN — IOPAMIDOL 75 ML: 755 INJECTION, SOLUTION INTRAVENOUS at 07:12

## 2023-12-01 ENCOUNTER — TELEPHONE (OUTPATIENT)
Dept: CARDIOLOGY CLINIC | Age: 63
End: 2023-12-01

## 2023-12-01 NOTE — TELEPHONE ENCOUNTER
Spoke with patient and reviewed result notes. Pt v/u. Patient wanted to schedule f/u with AMP. No f/u time period suggested for patient in LOV with AMP. AMP - when would you like this patient to schedule their f/u so we can facilitate this? Not urgent- can respond upon return.    ----- Message from Nivia Pendleton MD sent at 12/1/2023  3:40 PM EST -----  Please let the patient know reviewed CT scan which is showing normal aortic measures. Aortic root measures 3.7 cm at the sinuses of Valsalva which is grossly similar to 3.9 cm measured by echocardiogram.  Neither I would consider dilated in this patient. Reassuring study.   Follow-up with Dr. Karen Chino as prescribed

## 2023-12-04 NOTE — TELEPHONE ENCOUNTER
Called patient and relayed message from AMP. Patient not experiencing symptoms and will follow up as needed.

## 2024-01-31 DIAGNOSIS — I10 HYPERTENSION, UNSPECIFIED TYPE: ICD-10-CM

## 2024-01-31 DIAGNOSIS — R73.03 PRE-DIABETES: ICD-10-CM

## 2024-01-31 DIAGNOSIS — E78.5 DYSLIPIDEMIA: ICD-10-CM

## 2024-01-31 DIAGNOSIS — K21.9 GASTROESOPHAGEAL REFLUX DISEASE WITHOUT ESOPHAGITIS: ICD-10-CM

## 2024-01-31 RX ORDER — LOSARTAN POTASSIUM 50 MG/1
TABLET ORAL
Qty: 90 TABLET | Refills: 1 | Status: SHIPPED | OUTPATIENT
Start: 2024-01-31

## 2024-01-31 RX ORDER — ATORVASTATIN CALCIUM 20 MG/1
TABLET, FILM COATED ORAL
Qty: 90 TABLET | Refills: 1 | Status: SHIPPED | OUTPATIENT
Start: 2024-01-31

## 2024-01-31 NOTE — TELEPHONE ENCOUNTER
Last Office Visit  -  08/09/2023  Next Office Visit  -  n/a    Last Filled  -    Last UDS -    Contract -

## 2024-07-16 ENCOUNTER — OFFICE VISIT (OUTPATIENT)
Dept: FAMILY MEDICINE CLINIC | Age: 64
End: 2024-07-16
Payer: MEDICARE

## 2024-07-16 VITALS
WEIGHT: 232 LBS | SYSTOLIC BLOOD PRESSURE: 120 MMHG | BODY MASS INDEX: 31.42 KG/M2 | HEART RATE: 74 BPM | DIASTOLIC BLOOD PRESSURE: 74 MMHG | HEIGHT: 72 IN | OXYGEN SATURATION: 96 %

## 2024-07-16 DIAGNOSIS — N52.9 ERECTILE DYSFUNCTION, UNSPECIFIED ERECTILE DYSFUNCTION TYPE: ICD-10-CM

## 2024-07-16 DIAGNOSIS — R53.83 OTHER FATIGUE: ICD-10-CM

## 2024-07-16 DIAGNOSIS — I10 HYPERTENSION, UNSPECIFIED TYPE: ICD-10-CM

## 2024-07-16 DIAGNOSIS — E78.5 DYSLIPIDEMIA: ICD-10-CM

## 2024-07-16 DIAGNOSIS — K21.9 GASTROESOPHAGEAL REFLUX DISEASE, UNSPECIFIED WHETHER ESOPHAGITIS PRESENT: ICD-10-CM

## 2024-07-16 DIAGNOSIS — Z86.2 HISTORY OF ANEMIA: ICD-10-CM

## 2024-07-16 DIAGNOSIS — R73.03 PRE-DIABETES: ICD-10-CM

## 2024-07-16 DIAGNOSIS — R94.8 ABNORMAL RESULTS OF FUNCTION STUDIES OF OTHER ORGANS AND SYSTEMS: ICD-10-CM

## 2024-07-16 DIAGNOSIS — Z00.00 WELCOME TO MEDICARE PREVENTIVE VISIT: Primary | ICD-10-CM

## 2024-07-16 PROBLEM — E66.09 CLASS 1 OBESITY DUE TO EXCESS CALORIES WITHOUT SERIOUS COMORBIDITY WITH BODY MASS INDEX (BMI) OF 30.0 TO 30.9 IN ADULT: Status: RESOLVED | Noted: 2020-01-21 | Resolved: 2024-07-16

## 2024-07-16 PROBLEM — R94.31 ABNORMAL EKG: Status: RESOLVED | Noted: 2023-09-19 | Resolved: 2024-07-16

## 2024-07-16 PROBLEM — I49.3 PVC (PREMATURE VENTRICULAR CONTRACTION): Status: RESOLVED | Noted: 2023-09-19 | Resolved: 2024-07-16

## 2024-07-16 PROBLEM — E66.811 CLASS 1 OBESITY DUE TO EXCESS CALORIES WITHOUT SERIOUS COMORBIDITY WITH BODY MASS INDEX (BMI) OF 30.0 TO 30.9 IN ADULT: Status: RESOLVED | Noted: 2020-01-21 | Resolved: 2024-07-16

## 2024-07-16 PROBLEM — T46.4X5A ADVERSE EFFECT OF ANGIOTENSIN-CONVERTING ENZYME INHIBITOR: Status: RESOLVED | Noted: 2021-06-15 | Resolved: 2024-07-16

## 2024-07-16 PROBLEM — K90.9 INTESTINAL MALABSORPTION: Status: RESOLVED | Noted: 2020-12-15 | Resolved: 2024-07-16

## 2024-07-16 PROBLEM — D50.0 ANEMIA DUE TO CHRONIC BLOOD LOSS: Status: RESOLVED | Noted: 2020-02-28 | Resolved: 2024-07-16

## 2024-07-16 LAB
ALBUMIN SERPL-MCNC: 4.6 G/DL (ref 3.4–5)
ALBUMIN/GLOB SERPL: 1.5 {RATIO} (ref 1.1–2.2)
ALP SERPL-CCNC: 79 U/L (ref 40–129)
ALT SERPL-CCNC: 59 U/L (ref 10–40)
ANION GAP SERPL CALCULATED.3IONS-SCNC: 14 MMOL/L (ref 3–16)
AST SERPL-CCNC: 30 U/L (ref 15–37)
BASOPHILS # BLD: 0 K/UL (ref 0–0.2)
BASOPHILS NFR BLD: 0.7 %
BILIRUB SERPL-MCNC: 0.3 MG/DL (ref 0–1)
BUN SERPL-MCNC: 14 MG/DL (ref 7–20)
CALCIUM SERPL-MCNC: 10 MG/DL (ref 8.3–10.6)
CHLORIDE SERPL-SCNC: 104 MMOL/L (ref 99–110)
CHOLEST SERPL-MCNC: 215 MG/DL (ref 0–199)
CO2 SERPL-SCNC: 23 MMOL/L (ref 21–32)
CREAT SERPL-MCNC: 0.8 MG/DL (ref 0.8–1.3)
DEPRECATED RDW RBC AUTO: 14.6 % (ref 12.4–15.4)
EOSINOPHIL # BLD: 0.1 K/UL (ref 0–0.6)
EOSINOPHIL NFR BLD: 1.8 %
FERRITIN SERPL IA-MCNC: 140.1 NG/ML (ref 30–400)
GFR SERPLBLD CREATININE-BSD FMLA CKD-EPI: >90 ML/MIN/{1.73_M2}
GLUCOSE SERPL-MCNC: 120 MG/DL (ref 70–99)
HCT VFR BLD AUTO: 38.4 % (ref 40.5–52.5)
HDLC SERPL-MCNC: 49 MG/DL (ref 40–60)
HGB BLD-MCNC: 12.9 G/DL (ref 13.5–17.5)
IRON SATN MFR SERPL: 37 % (ref 20–50)
IRON SERPL-MCNC: 120 UG/DL (ref 59–158)
LDLC SERPL CALC-MCNC: 127 MG/DL
LYMPHOCYTES # BLD: 1.1 K/UL (ref 1–5.1)
LYMPHOCYTES NFR BLD: 27.6 %
MCH RBC QN AUTO: 29.9 PG (ref 26–34)
MCHC RBC AUTO-ENTMCNC: 33.6 G/DL (ref 31–36)
MCV RBC AUTO: 89 FL (ref 80–100)
MONOCYTES # BLD: 0.4 K/UL (ref 0–1.3)
MONOCYTES NFR BLD: 11.6 %
NEUTROPHILS # BLD: 2.3 K/UL (ref 1.7–7.7)
NEUTROPHILS NFR BLD: 58.3 %
PLATELET # BLD AUTO: 182 K/UL (ref 135–450)
PMV BLD AUTO: 8.5 FL (ref 5–10.5)
POTASSIUM SERPL-SCNC: 4.8 MMOL/L (ref 3.5–5.1)
PROT SERPL-MCNC: 7.6 G/DL (ref 6.4–8.2)
PSA SERPL DL<=0.01 NG/ML-MCNC: 0.67 NG/ML (ref 0–4)
RBC # BLD AUTO: 4.31 M/UL (ref 4.2–5.9)
SODIUM SERPL-SCNC: 141 MMOL/L (ref 136–145)
TIBC SERPL-MCNC: 323 UG/DL (ref 260–445)
TRIGL SERPL-MCNC: 193 MG/DL (ref 0–150)
TSH SERPL DL<=0.005 MIU/L-ACNC: 1.75 UIU/ML (ref 0.27–4.2)
VLDLC SERPL CALC-MCNC: 39 MG/DL
WBC # BLD AUTO: 3.9 K/UL (ref 4–11)

## 2024-07-16 PROCEDURE — G8417 CALC BMI ABV UP PARAM F/U: HCPCS | Performed by: REGISTERED NURSE

## 2024-07-16 PROCEDURE — 3078F DIAST BP <80 MM HG: CPT | Performed by: REGISTERED NURSE

## 2024-07-16 PROCEDURE — G0402 INITIAL PREVENTIVE EXAM: HCPCS | Performed by: REGISTERED NURSE

## 2024-07-16 PROCEDURE — 1036F TOBACCO NON-USER: CPT | Performed by: REGISTERED NURSE

## 2024-07-16 PROCEDURE — 1123F ACP DISCUSS/DSCN MKR DOCD: CPT | Performed by: REGISTERED NURSE

## 2024-07-16 PROCEDURE — 99214 OFFICE O/P EST MOD 30 MIN: CPT | Performed by: REGISTERED NURSE

## 2024-07-16 PROCEDURE — G8427 DOCREV CUR MEDS BY ELIG CLIN: HCPCS | Performed by: REGISTERED NURSE

## 2024-07-16 PROCEDURE — 3074F SYST BP LT 130 MM HG: CPT | Performed by: REGISTERED NURSE

## 2024-07-16 PROCEDURE — 3017F COLORECTAL CA SCREEN DOC REV: CPT | Performed by: REGISTERED NURSE

## 2024-07-16 SDOH — ECONOMIC STABILITY: FOOD INSECURITY: WITHIN THE PAST 12 MONTHS, THE FOOD YOU BOUGHT JUST DIDN'T LAST AND YOU DIDN'T HAVE MONEY TO GET MORE.: NEVER TRUE

## 2024-07-16 SDOH — ECONOMIC STABILITY: HOUSING INSECURITY
IN THE LAST 12 MONTHS, WAS THERE A TIME WHEN YOU DID NOT HAVE A STEADY PLACE TO SLEEP OR SLEPT IN A SHELTER (INCLUDING NOW)?: NO

## 2024-07-16 SDOH — ECONOMIC STABILITY: INCOME INSECURITY: HOW HARD IS IT FOR YOU TO PAY FOR THE VERY BASICS LIKE FOOD, HOUSING, MEDICAL CARE, AND HEATING?: NOT VERY HARD

## 2024-07-16 SDOH — ECONOMIC STABILITY: FOOD INSECURITY: WITHIN THE PAST 12 MONTHS, YOU WORRIED THAT YOUR FOOD WOULD RUN OUT BEFORE YOU GOT MONEY TO BUY MORE.: NEVER TRUE

## 2024-07-16 ASSESSMENT — PATIENT HEALTH QUESTIONNAIRE - PHQ9
1. LITTLE INTEREST OR PLEASURE IN DOING THINGS: NOT AT ALL
SUM OF ALL RESPONSES TO PHQ QUESTIONS 1-9: 0
2. FEELING DOWN, DEPRESSED OR HOPELESS: NOT AT ALL
SUM OF ALL RESPONSES TO PHQ9 QUESTIONS 1 & 2: 0

## 2024-07-16 ASSESSMENT — LIFESTYLE VARIABLES
HOW OFTEN DO YOU HAVE A DRINK CONTAINING ALCOHOL: MONTHLY OR LESS
HOW OFTEN DURING THE LAST YEAR HAVE YOU FAILED TO DO WHAT WAS NORMALLY EXPECTED FROM YOU BECAUSE OF DRINKING: NEVER
HOW OFTEN DURING THE LAST YEAR HAVE YOU NEEDED AN ALCOHOLIC DRINK FIRST THING IN THE MORNING TO GET YOURSELF GOING AFTER A NIGHT OF HEAVY DRINKING: NEVER
HOW OFTEN DURING THE LAST YEAR HAVE YOU BEEN UNABLE TO REMEMBER WHAT HAPPENED THE NIGHT BEFORE BECAUSE YOU HAD BEEN DRINKING: NEVER
HAS A RELATIVE, FRIEND, DOCTOR, OR ANOTHER HEALTH PROFESSIONAL EXPRESSED CONCERN ABOUT YOUR DRINKING OR SUGGESTED YOU CUT DOWN: NO
HOW OFTEN DURING THE LAST YEAR HAVE YOU FOUND THAT YOU WERE NOT ABLE TO STOP DRINKING ONCE YOU HAD STARTED: NEVER
HAVE YOU OR SOMEONE ELSE BEEN INJURED AS A RESULT OF YOUR DRINKING: NO
HOW OFTEN DURING THE LAST YEAR HAVE YOU HAD A FEELING OF GUILT OR REMORSE AFTER DRINKING: NEVER
HOW MANY STANDARD DRINKS CONTAINING ALCOHOL DO YOU HAVE ON A TYPICAL DAY: 7 TO 9

## 2024-07-16 NOTE — PROGRESS NOTES
Surgery)  Stanfield, Denver Thomas, MD (Orthopedic Surgery)  Erica Lara PA-C as Physician Assistant (Orthopedic Surgery)      Reviewed and updated this visit:  Tobacco  Allergies  Meds  Problems  Med Hx  Surg Hx  Soc Hx  Fam Hx

## 2024-07-16 NOTE — PATIENT INSTRUCTIONS
Exercise 30 minutes 5 times weekly.      9 Ways to Cut Back on Drinking  Maybe you've found yourself drinking more alcohol than you'd prefer. If you want to cut back, here are some ideas to try.    Think before you drink.  Do you really want a drink, or is it just a habit? If you're used to having a drink at a certain time, try doing something else then.     Look for substitutes.  Find some no-alcohol drinks that you enjoy, like flavored seltzer water, tea with honey, or tonic with a slice of lime. Or try alcohol-free beer or \"virgin\" cocktails (without the alcohol).     Drink more water.  Use water to quench your thirst. Drink a glass of water before you have any alcohol. Have another glass along with every drink or between drinks.     Shrink your drink.  For example, have a bottle of beer instead of a pint. Use a smaller glass for wine. Choose drinks with lower alcohol content (ABV%). Or use less liquor and more mixer in cocktails.     Slow down.  It's easy to drink quickly and without thinking about it. Pay attention, and make each drink last longer.     Do the math.  Total up how much you spend on alcohol each month. How much is that a year? If you cut back, what could you do with the money you save?     Take a break.  Choose a day or two each week when you won't drink at all. Notice how you feel on those days, physically and emotionally. How did you sleep? Do you feel better? Over time, add more break days.     Count calories.  Would you like to lose some weight? For some people that's a good motivator for cutting back. Figure out how many calories are in each drink. How many does that add up to in a day? In a week? In a month?     Practice saying no.  Be ready when someone offers you a drink. Try: \"Thanks, I've had enough.\" Or \"Thanks, but I'm cutting back.\" Or \"No, thanks. I feel better when I drink less.\"   Current as of: November 15, 2023  Content Version: 14.1  © 1915-8050 [employer], [facility].   Care

## 2024-07-17 DIAGNOSIS — E78.5 DYSLIPIDEMIA: ICD-10-CM

## 2024-07-17 LAB
EST. AVERAGE GLUCOSE BLD GHB EST-MCNC: 131.2 MG/DL
HBA1C MFR BLD: 6.2 %

## 2024-07-17 RX ORDER — ATORVASTATIN CALCIUM 40 MG/1
TABLET, FILM COATED ORAL
Qty: 90 TABLET | Refills: 3 | Status: SHIPPED | OUTPATIENT
Start: 2024-07-17

## 2024-07-25 DIAGNOSIS — R73.03 PRE-DIABETES: ICD-10-CM

## 2024-07-25 DIAGNOSIS — I10 HYPERTENSION, UNSPECIFIED TYPE: ICD-10-CM

## 2024-07-25 DIAGNOSIS — K21.9 GASTROESOPHAGEAL REFLUX DISEASE WITHOUT ESOPHAGITIS: ICD-10-CM

## 2024-07-25 RX ORDER — LOSARTAN POTASSIUM 50 MG/1
TABLET ORAL
Qty: 90 TABLET | Refills: 1 | Status: SHIPPED | OUTPATIENT
Start: 2024-07-25

## 2024-07-25 NOTE — TELEPHONE ENCOUNTER
Last Office Visit  -  7/16/24  Next Office Visit  -  n/a    Last Filled  -  1/31/24  Last UDS -    Contract -

## 2025-01-20 ENCOUNTER — TELEPHONE (OUTPATIENT)
Dept: FAMILY MEDICINE CLINIC | Age: 65
End: 2025-01-20

## 2025-01-20 DIAGNOSIS — R73.03 PRE-DIABETES: ICD-10-CM

## 2025-01-20 DIAGNOSIS — K21.9 GASTROESOPHAGEAL REFLUX DISEASE WITHOUT ESOPHAGITIS: ICD-10-CM

## 2025-01-20 DIAGNOSIS — I10 HYPERTENSION, UNSPECIFIED TYPE: ICD-10-CM

## 2025-01-20 RX ORDER — LOSARTAN POTASSIUM 50 MG/1
TABLET ORAL
Qty: 90 TABLET | Refills: 0 | Status: SHIPPED | OUTPATIENT
Start: 2025-01-20

## 2025-01-20 NOTE — TELEPHONE ENCOUNTER
Aman jensen remaria a refills for patient on  losartan (COZAAR) 50 MG tablet   esomeprazole (NEXIUM) 20 MG delayed release capsule   metFORMIN (GLUCOPHAGE) 500 MG tablet   Last visit 7/16/24  No future  Aman jensen

## 2025-01-20 NOTE — TELEPHONE ENCOUNTER
Last Office Visit  -  7/16/24  Next Office Visit  -  n/a    Last Filled  -  7/25/24  Last UDS -    Contract -

## 2025-04-19 DIAGNOSIS — K21.9 GASTROESOPHAGEAL REFLUX DISEASE WITHOUT ESOPHAGITIS: ICD-10-CM

## 2025-04-19 DIAGNOSIS — R73.03 PRE-DIABETES: ICD-10-CM

## 2025-04-19 DIAGNOSIS — I10 HYPERTENSION, UNSPECIFIED TYPE: ICD-10-CM

## 2025-04-21 RX ORDER — LOSARTAN POTASSIUM 50 MG/1
50 TABLET ORAL DAILY
Qty: 90 TABLET | Refills: 0 | Status: SHIPPED | OUTPATIENT
Start: 2025-04-21

## 2025-07-20 DIAGNOSIS — I10 HYPERTENSION, UNSPECIFIED TYPE: ICD-10-CM

## 2025-07-20 DIAGNOSIS — R73.03 PRE-DIABETES: ICD-10-CM

## 2025-07-20 DIAGNOSIS — E78.5 DYSLIPIDEMIA: ICD-10-CM

## 2025-07-20 DIAGNOSIS — K21.9 GASTROESOPHAGEAL REFLUX DISEASE WITHOUT ESOPHAGITIS: ICD-10-CM

## 2025-07-20 RX ORDER — LOSARTAN POTASSIUM 50 MG/1
50 TABLET ORAL DAILY
Qty: 90 TABLET | Refills: 0 | Status: SHIPPED | OUTPATIENT
Start: 2025-07-20

## 2025-07-20 RX ORDER — ATORVASTATIN CALCIUM 40 MG/1
40 TABLET, FILM COATED ORAL DAILY
Qty: 90 TABLET | Refills: 0 | Status: SHIPPED | OUTPATIENT
Start: 2025-07-20

## (undated) DEVICE — PACK PROCEDURE SURG ARTHSCP CUST

## (undated) DEVICE — GOWN,SIRUS,POLYRNF,SETINSLV,L,20/CS: Brand: MEDLINE

## (undated) DEVICE — Z DISCONTINUED USE 2275686 GLOVE SURG SZ 8 L12IN FNGR THK13MIL WHT ISOLEX POLYISOPRENE

## (undated) DEVICE — GOWN,REINFORCED,POLY,AURORA,XXLARGE,STR: Brand: MEDLINE

## (undated) DEVICE — 4-PORT MANIFOLD: Brand: NEPTUNE 2

## (undated) DEVICE — SET ADMIN PRIMING 7ML L30IN 7.35LB 20 GTT 2ND RLER CLMP

## (undated) DEVICE — TUBE IRRIG L8IN LNG PT W/ CONN FOR PMP SYS REDEUCE

## (undated) DEVICE — TUBING PMP L8FT LNG W/ CONN FOR AR-6400 REDEUCE

## (undated) DEVICE — METER ACCU-CHEK INFORM BASE GLUCOSE

## (undated) DEVICE — SET GRAV VENT NVENT CK VLV 3 NDL FREE PRT 10 GTT

## (undated) DEVICE — ZIMMER® STERILE DISPOSABLE TOURNIQUET CUFF WITH PLC, DUAL PORT, SINGLE BLADDER, 30 IN. (76 CM)

## (undated) DEVICE — Z CONVERTED USE 2273232 BANDAGE COMPR W6INXL11YD E KNIT DBL SELF CLSR EZE-BAND

## (undated) DEVICE — 3.5 MM FULL RADIUS STRAIGHT                                    BLADES, POWER/EP-1, BEIGE, PACKAGED                                    6 PER BOX, STERILE

## (undated) DEVICE — PADDING CAST W6INXL4YD ST COT RAYON MICROPLEATED HIGHLY

## (undated) DEVICE — PACK COOL L W14XL6.5IN 3 LAYR CONSTR SFT CLP CLSR 4 TIE

## (undated) DEVICE — CONVERTED USE 304929 SPONGE GAUZE CURITY 4X4IN 16-PLY ST

## (undated) DEVICE — SUTURE NONABSORBABLE MONOFILAMENT 3-0 PS-1 18 IN BLK ETHILON 1663H

## (undated) DEVICE — CATHETER IV 20GA L1.25IN PNK FEP SFTY STR HUB RADPQ DISP

## (undated) DEVICE — T-DRAPE,EXTREMITY,STERILE: Brand: MEDLINE

## (undated) DEVICE — GLOVE SURG SZ 65 L12IN FNGR THK94MIL STD WHT LTX FREE

## (undated) DEVICE — 3M™ TEGADERM™ TRANSPARENT FILM DRESSING FRAME STYLE, 1624W, 2-3/8 IN X 2-3/4 IN (6 CM X 7 CM), 100/CT 4CT/CASE: Brand: 3M™ TEGADERM™

## (undated) DEVICE — SOLUTION IV 1000ML LAC RINGERS PH 6.5 INJ USP VIAFLX PLAS

## (undated) DEVICE — KENDALL SCD EXPRESS SLEEVES, KNEE LENGTH, MEDIUM: Brand: KENDALL SCD